# Patient Record
Sex: MALE | Race: WHITE | NOT HISPANIC OR LATINO | Employment: OTHER | URBAN - METROPOLITAN AREA
[De-identification: names, ages, dates, MRNs, and addresses within clinical notes are randomized per-mention and may not be internally consistent; named-entity substitution may affect disease eponyms.]

---

## 2017-12-04 NOTE — H&P
Subjective:     Status post fall 12/1/17 sustaining right patella fracture.  Significant history vascular disease insulin-dependent diabetic admitted for ORIF right patella    There are no active problems to display for this patient.    No past medical history on file.   No past surgical history on file.   No prescriptions prior to admission.     Review of patient's allergies indicates:  No Known Allergies   Social History   Substance Use Topics    Smoking status: Not on file    Smokeless tobacco: Not on file    Alcohol use Not on file      No family history on file.   Review of Systems  Pertinent items are noted in HPI.    Objective:     No data found.    Heart regular lungs clear severe swelling echymosis right knee consistent with fracture.  Defect at the patella.  Unable to walk.  Skin cool and poor  vascularization    Imaging Review  Displaced transverse patella fracture    Assessment:     There are no hospital problems to display for this patient.      Plan:     The various methods of treatment have been discussed with the patient and family.   After consideration of risks, benefits and other options for treatment, the patient has consented to surgical interventions (Significant risks with this type of fracture  As well as severe medical risks discussed).  Questions were answered and Pre-op teaching was done by me.

## 2017-12-05 ENCOUNTER — ANESTHESIA EVENT (OUTPATIENT)
Dept: SURGERY | Facility: OTHER | Age: 62
End: 2017-12-05
Payer: MEDICARE

## 2017-12-05 ENCOUNTER — HOSPITAL ENCOUNTER (OUTPATIENT)
Dept: PREADMISSION TESTING | Facility: OTHER | Age: 62
Discharge: HOME | End: 2017-12-05
Attending: ORTHOPAEDIC SURGERY
Payer: MEDICARE

## 2017-12-05 VITALS
DIASTOLIC BLOOD PRESSURE: 64 MMHG | SYSTOLIC BLOOD PRESSURE: 142 MMHG | TEMPERATURE: 101 F | OXYGEN SATURATION: 98 % | WEIGHT: 215 LBS | HEIGHT: 71 IN | HEART RATE: 98 BPM | BODY MASS INDEX: 30.1 KG/M2

## 2017-12-05 DIAGNOSIS — Z01.818 PRE-OPERATIVE CLEARANCE: ICD-10-CM

## 2017-12-05 LAB
ALBUMIN SERPL BCP-MCNC: 2.7 G/DL
ALP SERPL-CCNC: 46 U/L
ALT SERPL W/O P-5'-P-CCNC: 19 U/L
ANION GAP SERPL CALC-SCNC: 5 MMOL/L
AST SERPL-CCNC: 24 U/L
BASOPHILS # BLD AUTO: 0.01 K/UL
BASOPHILS NFR BLD: 0.1 %
BILIRUB SERPL-MCNC: 0.5 MG/DL
BILIRUB UR QL STRIP: NEGATIVE
BUN SERPL-MCNC: 33 MG/DL
CALCIUM SERPL-MCNC: 9 MG/DL
CHLORIDE SERPL-SCNC: 98 MMOL/L
CLARITY UR: CLEAR
CO2 SERPL-SCNC: 27 MMOL/L
COLOR UR: YELLOW
CREAT SERPL-MCNC: 2 MG/DL
DIFFERENTIAL METHOD: ABNORMAL
EOSINOPHIL # BLD AUTO: 0.1 K/UL
EOSINOPHIL NFR BLD: 0.7 %
ERYTHROCYTE [DISTWIDTH] IN BLOOD BY AUTOMATED COUNT: 14.5 %
EST. GFR  (AFRICAN AMERICAN): 40 ML/MIN/1.73 M^2
EST. GFR  (NON AFRICAN AMERICAN): 34 ML/MIN/1.73 M^2
GLUCOSE SERPL-MCNC: 314 MG/DL
GLUCOSE UR QL STRIP: ABNORMAL
HCT VFR BLD AUTO: 26.9 %
HGB BLD-MCNC: 8.8 G/DL
HGB UR QL STRIP: ABNORMAL
KETONES UR QL STRIP: NEGATIVE
LEUKOCYTE ESTERASE UR QL STRIP: NEGATIVE
LYMPHOCYTES # BLD AUTO: 1.1 K/UL
LYMPHOCYTES NFR BLD: 7.2 %
MCH RBC QN AUTO: 27.8 PG
MCHC RBC AUTO-ENTMCNC: 32.7 G/DL
MCV RBC AUTO: 85 FL
MONOCYTES # BLD AUTO: 1.8 K/UL
MONOCYTES NFR BLD: 12 %
NEUTROPHILS # BLD AUTO: 12.1 K/UL
NEUTROPHILS NFR BLD: 79.6 %
NITRITE UR QL STRIP: NEGATIVE
PH UR STRIP: 6 [PH] (ref 5–8)
PLATELET # BLD AUTO: 259 K/UL
PMV BLD AUTO: 9.7 FL
POTASSIUM SERPL-SCNC: 4.6 MMOL/L
PROT SERPL-MCNC: 6.9 G/DL
PROT UR QL STRIP: ABNORMAL
RBC # BLD AUTO: 3.17 M/UL
SODIUM SERPL-SCNC: 130 MMOL/L
SP GR UR STRIP: 1.01 (ref 1–1.03)
URN SPEC COLLECT METH UR: ABNORMAL
UROBILINOGEN UR STRIP-ACNC: NEGATIVE EU/DL
WBC # BLD AUTO: 15.24 K/UL

## 2017-12-05 PROCEDURE — 93010 ELECTROCARDIOGRAM REPORT: CPT | Mod: ,,, | Performed by: INTERNAL MEDICINE

## 2017-12-05 PROCEDURE — 85025 COMPLETE CBC W/AUTO DIFF WBC: CPT

## 2017-12-05 PROCEDURE — 80053 COMPREHEN METABOLIC PANEL: CPT

## 2017-12-05 PROCEDURE — 81003 URINALYSIS AUTO W/O SCOPE: CPT

## 2017-12-05 PROCEDURE — 36415 COLL VENOUS BLD VENIPUNCTURE: CPT

## 2017-12-05 PROCEDURE — 93005 ELECTROCARDIOGRAM TRACING: CPT

## 2017-12-05 RX ORDER — ATORVASTATIN CALCIUM 20 MG/1
20 TABLET, FILM COATED ORAL DAILY
COMMUNITY

## 2017-12-05 RX ORDER — FENOFIBRATE 160 MG/1
160 TABLET ORAL DAILY
COMMUNITY

## 2017-12-05 RX ORDER — OXYCODONE AND ACETAMINOPHEN 5; 325 MG/1; MG/1
1 TABLET ORAL EVERY 4 HOURS PRN
Status: ON HOLD | COMMUNITY
End: 2017-12-09

## 2017-12-05 RX ORDER — IBUPROFEN 100 MG/5ML
1000 SUSPENSION, ORAL (FINAL DOSE FORM) ORAL DAILY
COMMUNITY

## 2017-12-05 RX ORDER — CALCIUM CARBONATE/VITAMIN D3 500-10/5ML
LIQUID (ML) ORAL 2 TIMES DAILY
COMMUNITY

## 2017-12-05 RX ORDER — INSULIN ASPART 100 [IU]/ML
INJECTION, SOLUTION INTRAVENOUS; SUBCUTANEOUS
COMMUNITY

## 2017-12-05 RX ORDER — INSULIN GLARGINE 100 [IU]/ML
22 INJECTION, SOLUTION SUBCUTANEOUS NIGHTLY
COMMUNITY
End: 2020-11-05 | Stop reason: SDUPTHER

## 2017-12-05 RX ORDER — SODIUM CHLORIDE, SODIUM LACTATE, POTASSIUM CHLORIDE, CALCIUM CHLORIDE 600; 310; 30; 20 MG/100ML; MG/100ML; MG/100ML; MG/100ML
INJECTION, SOLUTION INTRAVENOUS CONTINUOUS
Status: CANCELLED | OUTPATIENT
Start: 2017-12-05

## 2017-12-05 RX ORDER — POTASSIUM CHLORIDE 750 MG/1
10 CAPSULE, EXTENDED RELEASE ORAL ONCE
COMMUNITY

## 2017-12-05 RX ORDER — CLOPIDOGREL BISULFATE 75 MG/1
75 TABLET ORAL DAILY
COMMUNITY

## 2017-12-05 RX ORDER — ROPINIROLE 1 MG/1
1 TABLET, FILM COATED ORAL NIGHTLY
COMMUNITY

## 2017-12-05 RX ORDER — SAXAGLIPTIN 5 MG/1
TABLET, FILM COATED ORAL DAILY
COMMUNITY
End: 2020-11-06

## 2017-12-05 RX ORDER — GABAPENTIN 300 MG/1
300 CAPSULE ORAL 2 TIMES DAILY
COMMUNITY

## 2017-12-05 RX ORDER — SULFAMETHOXAZOLE AND TRIMETHOPRIM 800; 160 MG/1; MG/1
1 TABLET ORAL
COMMUNITY
End: 2018-04-09 | Stop reason: CLARIF

## 2017-12-05 RX ORDER — ASPIRIN 81 MG/1
81 TABLET ORAL DAILY
COMMUNITY

## 2017-12-05 RX ORDER — LIDOCAINE HYDROCHLORIDE 10 MG/ML
1 INJECTION, SOLUTION EPIDURAL; INFILTRATION; INTRACAUDAL; PERINEURAL ONCE
Status: CANCELLED | OUTPATIENT
Start: 2017-12-05 | End: 2017-12-05

## 2017-12-05 RX ORDER — METOPROLOL SUCCINATE 50 MG/1
25 TABLET, EXTENDED RELEASE ORAL DAILY
COMMUNITY

## 2017-12-05 NOTE — ANESTHESIA PREPROCEDURE EVALUATION
12/05/2017  Luca Camarena III is a 62 y.o., male.    Anesthesia Evaluation    I have reviewed the Patient Summary Reports.    I have reviewed the Nursing Notes.   I have reviewed the Medications.     Review of Systems  Anesthesia Hx:  No problems with previous Anesthesia  History of prior surgery of interest to airway management or planning: Previous anesthesia: General Partial foot amp 2 yrs ago with general anesthesia.  Denies Family Hx of Anesthesia complications.   Denies Personal Hx of Anesthesia complications.   Social:  Non-Smoker    Hematology/Oncology:  Hematology Normal   Oncology Normal     EENT/Dental:EENT/Dental Normal   Cardiovascular:   Exercise tolerance: poor Hypertension, well controlled CAD  CABG/stent  PVD hyperlipidemia Stents 2004 followed by Cardiology on PLavix   Pulmonary:   Followed by pulmonology for paralyzed diaphram   Renal/:   Chronic Renal Disease, CRI Followed by Nephrology for CRI   Hepatic/GI:  Hepatic/GI Normal    Musculoskeletal:   Arthritis     Neurological:   Neuromuscular Disease,    Endocrine:   Diabetes, type 1, using insulin    Psych:  Psychiatric Normal           Physical Exam  General:  Obesity    Airway/Jaw/Neck:  Airway Findings: Mouth Opening: Normal Tongue: Normal  Mallampati: II      Dental:  Dental Findings: Periodontal disease, Mild, molar caps        Mental Status:  Mental Status Findings:  Cooperative, Alert and Oriented         Anesthesia Plan  Type of Anesthesia, risks & benefits discussed:  Anesthesia Type:  general  Patient's Preference:   Intra-op Monitoring Plan:   Intra-op Monitoring Plan Comments:   Post Op Pain Control Plan:   Post Op Pain Control Plan Comments:   Induction:   IV  Beta Blocker:         Informed Consent: Patient understands risks and agrees with Anesthesia plan.  Questions answered. Anesthesia consent signed with patient.  ASA  Score: 3     Day of Surgery Review of History & Physical:    H&P update referred to the surgeon.     Anesthesia Plan Notes: Still on Plavix, no spinal.labs and EKG today,[facility] [place] issues    H/h 8.8/26.9.  Dorothy aware. T+S ordered        Ready For Surgery From Anesthesia Perspective.

## 2017-12-05 NOTE — DISCHARGE INSTRUCTIONS
PRE-ADMIT TESTING -  [phone]    [address]  [place] [facility]          Your surgery has been scheduled at [facility]. We are pleased to have the opportunity to serve you. For Further Information please call [phone].    On the day of surgery please report to the Information Desk on the 1st floor.    · CONTACT YOUR PHYSICIAN'S OFFICE THE DAY PRIOR TO YOUR SURGERY TO OBTAIN YOUR ARRIVAL TIME.     · The evening before surgery do not eat anything after 9 p.m. ( this includes hard candy, chewing gum and mints).  You may only have GATORADE, POWERADE AND WATER  from 9 p.m. until you leave your home.   DO NOT DRINK ANY LIQUIDS ON THE WAY TO THE HOSPITAL.      SPECIAL MEDICATION INSTRUCTIONS: TAKE medications checked off by the Anesthesiologist on your Medication List.    Angiogram Patients: Take medications as instructed by your physician, including aspirin.     Surgery Patients:    If you take ASPIRIN - Your PHYSICIAN/SURGEON will need to inform you IF/OR when you need to stop taking aspirin prior to your surgery.     Do Not take any medications containing IBUPROFEN.  Do Not Wear any make-up or dark nail polish   (especially eye make-up) to surgery. If you come to surgery with makeup on you will be required to remove the makeup or nail polish.    Do not shave your surgical area at least 5 days prior to your surgery. The surgical prep will be performed at the hospital according to Infection Control regulations.    Leave all valuables at home.   Do Not wear any jewelry or watches, including any metal in body piercings.  Contact Lens must be removed before surgery. Either do not wear the contact lens or bring a case and solution for storage.  Please bring a container for eyeglasses or dentures as required.  Bring any paperwork your physician has provided, such as consent forms,  history and physicals, doctor's orders, etc.   Bring comfortable clothes that are loose fitting to wear upon  discharge. Take into consideration the type of surgery being performed.  Maintain your diet as advised per your physician the day prior to surgery.      Adequate rest the night before surgery is advised.   Park in the Parking lot behind the hospital or in the [place] [facility] across the street from the parking lot. Parking is complimentary.  If you will be discharged the same day as your procedure, please arrange for a responsible adult to drive you home or to accompany you if traveling by taxi.   YOU WILL NOT BE PERMITTED TO DRIVE OR TO LEAVE THE HOSPITAL ALONE AFTER SURGERY.   It is strongly recommended that you arrange for someone to remain with you for the first 24 hrs following your surgery.       Thank you for your cooperation.  The Staff of [facility].        Bathing Instructions                                                                 Please shower the evening before and morning of your procedure with    ANTIBACTERIAL SOAP. ( DIAL, etc )  Concentrate on the surgical area   for at least 3 minutes and rinse completely. Dry off as usual.   Do not use any deodorant, powder, body lotions, perfume, after shave or    cologne.

## 2017-12-08 ENCOUNTER — ANESTHESIA (OUTPATIENT)
Dept: SURGERY | Facility: OTHER | Age: 62
End: 2017-12-08
Payer: MEDICARE

## 2017-12-08 ENCOUNTER — HOSPITAL ENCOUNTER (OUTPATIENT)
Facility: OTHER | Age: 62
LOS: 1 days | Discharge: HOME HEALTH | End: 2017-12-10
Attending: ORTHOPAEDIC SURGERY | Admitting: ORTHOPAEDIC SURGERY
Payer: MEDICARE

## 2017-12-08 DIAGNOSIS — S82.001A RIGHT PATELLA FRACTURE: ICD-10-CM

## 2017-12-08 LAB
ABO + RH BLD: NORMAL
BLD GP AB SCN CELLS X3 SERPL QL: NORMAL
POCT GLUCOSE: 112 MG/DL (ref 70–110)
POCT GLUCOSE: 115 MG/DL (ref 70–110)
POCT GLUCOSE: 236 MG/DL (ref 70–110)

## 2017-12-08 PROCEDURE — 63600175 PHARM REV CODE 636 W HCPCS: Performed by: NURSE ANESTHETIST, CERTIFIED REGISTERED

## 2017-12-08 PROCEDURE — 37000009 HC ANESTHESIA EA ADD 15 MINS: Performed by: ORTHOPAEDIC SURGERY

## 2017-12-08 PROCEDURE — 86900 BLOOD TYPING SEROLOGIC ABO: CPT

## 2017-12-08 PROCEDURE — 25000003 PHARM REV CODE 250: Performed by: NURSE PRACTITIONER

## 2017-12-08 PROCEDURE — 36000710: Performed by: ORTHOPAEDIC SURGERY

## 2017-12-08 PROCEDURE — 63600175 PHARM REV CODE 636 W HCPCS: Performed by: ANESTHESIOLOGY

## 2017-12-08 PROCEDURE — 63600175 PHARM REV CODE 636 W HCPCS: Performed by: ORTHOPAEDIC SURGERY

## 2017-12-08 PROCEDURE — 86901 BLOOD TYPING SEROLOGIC RH(D): CPT

## 2017-12-08 PROCEDURE — 94761 N-INVAS EAR/PLS OXIMETRY MLT: CPT

## 2017-12-08 PROCEDURE — 71000033 HC RECOVERY, INTIAL HOUR: Performed by: ORTHOPAEDIC SURGERY

## 2017-12-08 PROCEDURE — 37000008 HC ANESTHESIA 1ST 15 MINUTES: Performed by: ORTHOPAEDIC SURGERY

## 2017-12-08 PROCEDURE — 71000039 HC RECOVERY, EACH ADD'L HOUR: Performed by: ORTHOPAEDIC SURGERY

## 2017-12-08 PROCEDURE — 25000003 PHARM REV CODE 250: Performed by: ANESTHESIOLOGY

## 2017-12-08 PROCEDURE — 63600175 PHARM REV CODE 636 W HCPCS: Performed by: NURSE PRACTITIONER

## 2017-12-08 PROCEDURE — 82962 GLUCOSE BLOOD TEST: CPT | Performed by: ORTHOPAEDIC SURGERY

## 2017-12-08 PROCEDURE — 36000711: Performed by: ORTHOPAEDIC SURGERY

## 2017-12-08 PROCEDURE — 25000003 PHARM REV CODE 250: Performed by: NURSE ANESTHETIST, CERTIFIED REGISTERED

## 2017-12-08 RX ORDER — ACETAMINOPHEN 10 MG/ML
INJECTION, SOLUTION INTRAVENOUS
Status: DISCONTINUED | OUTPATIENT
Start: 2017-12-08 | End: 2017-12-08

## 2017-12-08 RX ORDER — OXYCODONE HYDROCHLORIDE 5 MG/1
5 TABLET ORAL
Status: DISCONTINUED | OUTPATIENT
Start: 2017-12-08 | End: 2017-12-08 | Stop reason: SDUPTHER

## 2017-12-08 RX ORDER — PROPOFOL 10 MG/ML
VIAL (ML) INTRAVENOUS
Status: DISCONTINUED | OUTPATIENT
Start: 2017-12-08 | End: 2017-12-08

## 2017-12-08 RX ORDER — GLUCAGON 1 MG
1 KIT INJECTION
Status: DISCONTINUED | OUTPATIENT
Start: 2017-12-08 | End: 2017-12-10 | Stop reason: HOSPADM

## 2017-12-08 RX ORDER — SODIUM CHLORIDE, SODIUM LACTATE, POTASSIUM CHLORIDE, CALCIUM CHLORIDE 600; 310; 30; 20 MG/100ML; MG/100ML; MG/100ML; MG/100ML
INJECTION, SOLUTION INTRAVENOUS CONTINUOUS
Status: DISCONTINUED | OUTPATIENT
Start: 2017-12-08 | End: 2017-12-08

## 2017-12-08 RX ORDER — GLYCOPYRROLATE 0.2 MG/ML
INJECTION INTRAMUSCULAR; INTRAVENOUS
Status: DISCONTINUED | OUTPATIENT
Start: 2017-12-08 | End: 2017-12-08

## 2017-12-08 RX ORDER — FENTANYL CITRATE 50 UG/ML
25 INJECTION, SOLUTION INTRAMUSCULAR; INTRAVENOUS EVERY 5 MIN PRN
Status: DISCONTINUED | OUTPATIENT
Start: 2017-12-08 | End: 2017-12-08 | Stop reason: HOSPADM

## 2017-12-08 RX ORDER — GABAPENTIN 100 MG/1
100 CAPSULE ORAL 2 TIMES DAILY
Status: DISCONTINUED | OUTPATIENT
Start: 2017-12-08 | End: 2017-12-10 | Stop reason: HOSPADM

## 2017-12-08 RX ORDER — SODIUM CHLORIDE 0.9 % (FLUSH) 0.9 %
3 SYRINGE (ML) INJECTION
Status: DISCONTINUED | OUTPATIENT
Start: 2017-12-08 | End: 2017-12-10 | Stop reason: HOSPADM

## 2017-12-08 RX ORDER — ROCURONIUM BROMIDE 10 MG/ML
INJECTION, SOLUTION INTRAVENOUS
Status: DISCONTINUED | OUTPATIENT
Start: 2017-12-08 | End: 2017-12-08

## 2017-12-08 RX ORDER — MEPERIDINE HYDROCHLORIDE 50 MG/ML
12.5 INJECTION INTRAMUSCULAR; INTRAVENOUS; SUBCUTANEOUS ONCE AS NEEDED
Status: DISCONTINUED | OUTPATIENT
Start: 2017-12-08 | End: 2017-12-08 | Stop reason: SDUPTHER

## 2017-12-08 RX ORDER — SODIUM CHLORIDE 0.9 % (FLUSH) 0.9 %
5 SYRINGE (ML) INJECTION
Status: DISCONTINUED | OUTPATIENT
Start: 2017-12-08 | End: 2017-12-10 | Stop reason: HOSPADM

## 2017-12-08 RX ORDER — MEPERIDINE HYDROCHLORIDE 50 MG/ML
12.5 INJECTION INTRAMUSCULAR; INTRAVENOUS; SUBCUTANEOUS ONCE AS NEEDED
Status: DISCONTINUED | OUTPATIENT
Start: 2017-12-08 | End: 2017-12-08 | Stop reason: HOSPADM

## 2017-12-08 RX ORDER — ONDANSETRON HYDROCHLORIDE 2 MG/ML
INJECTION, SOLUTION INTRAMUSCULAR; INTRAVENOUS
Status: DISCONTINUED | OUTPATIENT
Start: 2017-12-08 | End: 2017-12-08

## 2017-12-08 RX ORDER — OXYCODONE HYDROCHLORIDE 5 MG/1
5 TABLET ORAL
Status: DISCONTINUED | OUTPATIENT
Start: 2017-12-08 | End: 2017-12-08 | Stop reason: HOSPADM

## 2017-12-08 RX ORDER — CEFAZOLIN SODIUM 2 G/50ML
2 SOLUTION INTRAVENOUS
Status: COMPLETED | OUTPATIENT
Start: 2017-12-08 | End: 2017-12-08

## 2017-12-08 RX ORDER — LIDOCAINE HCL/PF 100 MG/5ML
SYRINGE (ML) INTRAVENOUS
Status: DISCONTINUED | OUTPATIENT
Start: 2017-12-08 | End: 2017-12-08

## 2017-12-08 RX ORDER — OXYCODONE HYDROCHLORIDE 5 MG/1
10 TABLET ORAL EVERY 4 HOURS PRN
Status: DISCONTINUED | OUTPATIENT
Start: 2017-12-08 | End: 2017-12-10 | Stop reason: HOSPADM

## 2017-12-08 RX ORDER — INSULIN ASPART 100 [IU]/ML
0-5 INJECTION, SOLUTION INTRAVENOUS; SUBCUTANEOUS
Status: DISCONTINUED | OUTPATIENT
Start: 2017-12-08 | End: 2017-12-10 | Stop reason: HOSPADM

## 2017-12-08 RX ORDER — OXYCODONE HYDROCHLORIDE 5 MG/1
5 TABLET ORAL EVERY 4 HOURS PRN
Status: DISCONTINUED | OUTPATIENT
Start: 2017-12-08 | End: 2017-12-10 | Stop reason: HOSPADM

## 2017-12-08 RX ORDER — ROPIVACAINE HYDROCHLORIDE 5 MG/ML
INJECTION, SOLUTION EPIDURAL; INFILTRATION; PERINEURAL
Status: DISCONTINUED | OUTPATIENT
Start: 2017-12-08 | End: 2017-12-08 | Stop reason: HOSPADM

## 2017-12-08 RX ORDER — ONDANSETRON 2 MG/ML
4 INJECTION INTRAMUSCULAR; INTRAVENOUS DAILY PRN
Status: DISCONTINUED | OUTPATIENT
Start: 2017-12-08 | End: 2017-12-08 | Stop reason: HOSPADM

## 2017-12-08 RX ORDER — ROPINIROLE 0.25 MG/1
1 TABLET, FILM COATED ORAL NIGHTLY
Status: DISCONTINUED | OUTPATIENT
Start: 2017-12-08 | End: 2017-12-10 | Stop reason: HOSPADM

## 2017-12-08 RX ORDER — LIDOCAINE HYDROCHLORIDE 10 MG/ML
1 INJECTION, SOLUTION EPIDURAL; INFILTRATION; INTRACAUDAL; PERINEURAL ONCE
Status: DISCONTINUED | OUTPATIENT
Start: 2017-12-08 | End: 2017-12-08 | Stop reason: HOSPADM

## 2017-12-08 RX ORDER — HYDROMORPHONE HYDROCHLORIDE 2 MG/ML
0.4 INJECTION, SOLUTION INTRAMUSCULAR; INTRAVENOUS; SUBCUTANEOUS EVERY 5 MIN PRN
Status: DISCONTINUED | OUTPATIENT
Start: 2017-12-08 | End: 2017-12-08 | Stop reason: SDUPTHER

## 2017-12-08 RX ORDER — ONDANSETRON 2 MG/ML
4 INJECTION INTRAMUSCULAR; INTRAVENOUS DAILY PRN
Status: DISCONTINUED | OUTPATIENT
Start: 2017-12-08 | End: 2017-12-08 | Stop reason: SDUPTHER

## 2017-12-08 RX ORDER — IBUPROFEN 200 MG
24 TABLET ORAL
Status: DISCONTINUED | OUTPATIENT
Start: 2017-12-08 | End: 2017-12-10 | Stop reason: HOSPADM

## 2017-12-08 RX ORDER — ATORVASTATIN CALCIUM 20 MG/1
20 TABLET, FILM COATED ORAL DAILY
Status: DISCONTINUED | OUTPATIENT
Start: 2017-12-09 | End: 2017-12-10 | Stop reason: HOSPADM

## 2017-12-08 RX ORDER — FENOFIBRATE 160 MG/1
160 TABLET ORAL DAILY
Status: DISCONTINUED | OUTPATIENT
Start: 2017-12-09 | End: 2017-12-10 | Stop reason: HOSPADM

## 2017-12-08 RX ORDER — FENTANYL CITRATE 50 UG/ML
INJECTION, SOLUTION INTRAMUSCULAR; INTRAVENOUS
Status: DISCONTINUED | OUTPATIENT
Start: 2017-12-08 | End: 2017-12-08

## 2017-12-08 RX ORDER — IBUPROFEN 200 MG
16 TABLET ORAL
Status: DISCONTINUED | OUTPATIENT
Start: 2017-12-08 | End: 2017-12-10 | Stop reason: HOSPADM

## 2017-12-08 RX ORDER — SODIUM CHLORIDE 9 MG/ML
INJECTION, SOLUTION INTRAVENOUS CONTINUOUS
Status: DISCONTINUED | OUTPATIENT
Start: 2017-12-08 | End: 2017-12-09

## 2017-12-08 RX ORDER — NEOSTIGMINE METHYLSULFATE 1 MG/ML
INJECTION, SOLUTION INTRAVENOUS
Status: DISCONTINUED | OUTPATIENT
Start: 2017-12-08 | End: 2017-12-08

## 2017-12-08 RX ORDER — HYDROMORPHONE HYDROCHLORIDE 2 MG/ML
0.4 INJECTION, SOLUTION INTRAMUSCULAR; INTRAVENOUS; SUBCUTANEOUS EVERY 5 MIN PRN
Status: DISCONTINUED | OUTPATIENT
Start: 2017-12-08 | End: 2017-12-08 | Stop reason: HOSPADM

## 2017-12-08 RX ORDER — METOPROLOL SUCCINATE 50 MG/1
50 TABLET, EXTENDED RELEASE ORAL DAILY
Status: DISCONTINUED | OUTPATIENT
Start: 2017-12-09 | End: 2017-12-10 | Stop reason: HOSPADM

## 2017-12-08 RX ADMIN — HYDROMORPHONE HYDROCHLORIDE 0.4 MG: 2 INJECTION INTRAMUSCULAR; INTRAVENOUS; SUBCUTANEOUS at 02:12

## 2017-12-08 RX ADMIN — FENTANYL CITRATE 100 MCG: 50 INJECTION, SOLUTION INTRAMUSCULAR; INTRAVENOUS at 01:12

## 2017-12-08 RX ADMIN — HYDROMORPHONE HYDROCHLORIDE 0.4 MG: 2 INJECTION INTRAMUSCULAR; INTRAVENOUS; SUBCUTANEOUS at 01:12

## 2017-12-08 RX ADMIN — FENTANYL CITRATE 100 MCG: 50 INJECTION, SOLUTION INTRAMUSCULAR; INTRAVENOUS at 12:12

## 2017-12-08 RX ADMIN — ROCURONIUM BROMIDE 30 MG: 10 INJECTION, SOLUTION INTRAVENOUS at 12:12

## 2017-12-08 RX ADMIN — SODIUM CHLORIDE, SODIUM LACTATE, POTASSIUM CHLORIDE, AND CALCIUM CHLORIDE: 600; 310; 30; 20 INJECTION, SOLUTION INTRAVENOUS at 12:12

## 2017-12-08 RX ADMIN — ROPINIROLE HYDROCHLORIDE 1 MG: 0.25 TABLET, FILM COATED ORAL at 08:12

## 2017-12-08 RX ADMIN — ONDANSETRON 4 MG: 2 INJECTION, SOLUTION INTRAMUSCULAR; INTRAVENOUS at 01:12

## 2017-12-08 RX ADMIN — OXYCODONE HYDROCHLORIDE 5 MG: 5 TABLET ORAL at 03:12

## 2017-12-08 RX ADMIN — ACETAMINOPHEN 1000 MG: 10 INJECTION, SOLUTION INTRAVENOUS at 01:12

## 2017-12-08 RX ADMIN — CARBOXYMETHYLCELLULOSE SODIUM 2 DROP: 2.5 SOLUTION/ DROPS OPHTHALMIC at 12:12

## 2017-12-08 RX ADMIN — LIDOCAINE HYDROCHLORIDE 100 MG: 20 INJECTION, SOLUTION INTRAVENOUS at 12:12

## 2017-12-08 RX ADMIN — PROPOFOL 150 MG: 10 INJECTION, EMULSION INTRAVENOUS at 12:12

## 2017-12-08 RX ADMIN — SODIUM CHLORIDE: 0.9 INJECTION, SOLUTION INTRAVENOUS at 04:12

## 2017-12-08 RX ADMIN — CEFAZOLIN SODIUM 2 G: 2 SOLUTION INTRAVENOUS at 01:12

## 2017-12-08 RX ADMIN — FENTANYL CITRATE 50 MCG: 50 INJECTION, SOLUTION INTRAMUSCULAR; INTRAVENOUS at 01:12

## 2017-12-08 RX ADMIN — GABAPENTIN 100 MG: 100 CAPSULE ORAL at 08:12

## 2017-12-08 RX ADMIN — INSULIN DETEMIR 20 UNITS: 100 INJECTION, SOLUTION SUBCUTANEOUS at 08:12

## 2017-12-08 RX ADMIN — NEOSTIGMINE METHYLSULFATE 5 MG: 1 INJECTION INTRAVENOUS at 01:12

## 2017-12-08 RX ADMIN — GLYCOPYRROLATE 0.6 MG: 0.2 INJECTION, SOLUTION INTRAMUSCULAR; INTRAVENOUS at 01:12

## 2017-12-08 RX ADMIN — INSULIN ASPART 1 UNITS: 100 INJECTION, SOLUTION INTRAVENOUS; SUBCUTANEOUS at 08:12

## 2017-12-08 NOTE — BRIEF OP NOTE
[facility]  Brief Operative Note     SUMMARY     Surgery Date: 12/8/2017     Surgeon(s) and Role:     * Armando Augustin MD - Primary    Assisting Surgeon: None    Pre-op Diagnosis:  Closed fracture of proximal end of right tibia, unspecified fracture morphology, initial encounter [S82.101A]    Post-op Diagnosis:  Post-Op Diagnosis Codes:     * Closed fracture of proximal end of right tibia, unspecified fracture morphology, initial encounter [S82.101A]    Procedure(s) (LRB):  OPEN REDUCTION INTERNAL FIXATION-PATELLA (Right)    Anesthesia: General    Description of the findings of the procedure: Transverse right patella fracture ORIF with suture material    Findings/Key Components: Measures right patella fracture    Estimated Blood Loss: * No values recorded between 12/8/2017  1:09 PM and 12/8/2017  1:39 PM *         Specimens:   Specimen (12h ago through future)    None          Discharge Note    SUMMARY     Admit Date: 12/8/2017    Discharge Date and Time: No discharge date for patient encounter.    Hospital Course (synopsis of major diagnoses, care, treatment, and services provided during the course of the hospital stay): The above patient underwent the above outpatient procedure. The patient tolerated procedure well and will be discharged today.--see orders.       Final Diagnosis: Post-Op Diagnosis Codes:     * Closed fracture of proximal end of right tibia, unspecified fracture morphology, initial encounter [S82.101A]    Disposition: Home or Self Care    Follow Up/Patient Instructions:     Medications:  Reconciled Home Medications:   Current Discharge Medication List      CONTINUE these medications which have NOT CHANGED    Details   ACETAMINOPHEN (TYLENOL ARTHRITIS ORAL) Take by mouth.      ascorbic acid, vitamin C, (VITAMIN C) 1000 MG tablet Take 1,000 mg by mouth once daily.      aspirin (ECOTRIN) 81 MG EC tablet Take 81 mg by mouth once daily.      atorvastatin (LIPITOR) 20 MG tablet  Take 20 mg by mouth once daily.      clopidogrel (PLAVIX) 75 mg tablet Take 75 mg by mouth once daily.      CYANOCOBALAMIN, VITAMIN B-12, (VITAMIN B-12 ORAL) Take by mouth.      fenofibrate 160 MG Tab Take 160 mg by mouth once daily.      FERROUS SULFATE (IRON ORAL) Take by mouth.      gabapentin (NEURONTIN) 300 MG capsule Take 300 mg by mouth 2 (two) times daily. 1 in the morning, 3 in the evening      GUAIFENESIN/DEXTROMETHORPHAN (MUCINEX DM ORAL) Take by mouth 2 (two) times daily.      insulin aspart (NOVOLOG) 100 unit/mL injection Inject into the skin 3 (three) times daily before meals. 16/14/18 units      insulin glargine (LANTUS) 100 unit/mL injection Inject 28 Units into the skin every evening.      magnesium oxide 400 mg Cap Take by mouth 2 (two) times daily.      metoprolol succinate (TOPROL-XL) 50 MG 24 hr tablet Take 50 mg by mouth 2 (two) times daily. 1/2 tablet in the morning, 1 in the evening      multivitamin capsule Take 1 capsule by mouth once daily.      oxyCODONE-acetaminophen (PERCOCET) 5-325 mg per tablet Take 1 tablet by mouth every 4 (four) hours as needed for Pain.      potassium chloride (MICRO-K) 10 MEQ CpSR Take 10 mEq by mouth once.      rOPINIRole (REQUIP) 1 MG tablet Take 1 mg by mouth every evening.      SAXagliptin (ONGLYZA) 5 mg Tab tablet Take by mouth once daily.      sulfamethoxazole-trimethoprim 800-160mg (BACTRIM DS) 800-160 mg Tab Take 1 tablet by mouth every 12 (twelve) hours.           No discharge procedures on file.

## 2017-12-08 NOTE — PROGRESS NOTES
The patient verbalized understanding of the blood consent as explained and documented on the consent form  by the physician and  has no further questions at this time. The blood consent was signed by the patient and the patient's signature was witnessed by the RN.

## 2017-12-08 NOTE — INTERVAL H&P NOTE
The patient has been examined and the H&P has been reviewed:    I concur with the findings and no changes have occurred since H&P was written.    Anesthesia/Surgery risks, benefits and alternative options discussed and understood by patient/family.          Active Hospital Problems    Diagnosis  POA    *Right patella fracture [S82.001A]  Yes      Resolved Hospital Problems    Diagnosis Date Resolved POA   No resolved problems to display.

## 2017-12-08 NOTE — ANESTHESIA POSTPROCEDURE EVALUATION
"Anesthesia Post Evaluation    Patient: Luca Allisonler III    Procedure(s) Performed: Procedure(s) (LRB):  OPEN REDUCTION INTERNAL FIXATION-PATELLA (Right)    Final Anesthesia Type: general  Patient location during evaluation: PACU  Patient participation: Yes- Able to Participate  Level of consciousness: awake and alert  Post-procedure vital signs: reviewed and stable  Pain management: adequate  Airway patency: patent  PONV status at discharge: No PONV  Anesthetic complications: no      Cardiovascular status: blood pressure returned to baseline and stable  Respiratory status: unassisted, spontaneous ventilation and room air  Hydration status: euvolemic  Follow-up not needed.        Visit Vitals  /61 (BP Location: Right arm, Patient Position: Lying)   Pulse 96   Temp 37.3 °C (99.1 °F) (Oral)   Resp 16   Ht 5' 11" (1.803 m)   Wt 97.5 kg (215 lb)   SpO2 96%   BMI 29.99 kg/m²       Pain/Oscar Score: Pain Assessment Performed: Yes (12/8/2017  9:45 AM)  Presence of Pain: complains of pain/discomfort (12/8/2017  9:45 AM)      "

## 2017-12-08 NOTE — TRANSFER OF CARE
"Anesthesia Transfer of Care Note    Patient: Luca Allisonler III    Procedure(s) Performed: Procedure(s) (LRB):  OPEN REDUCTION INTERNAL FIXATION-PATELLA (Right)    Patient location: PACU    Anesthesia Type: general    Post pain: adequate analgesia    Post assessment: no apparent anesthetic complications    Post vital signs: stable    Level of consciousness: awake and alert    Nausea/Vomiting: no nausea/vomiting    Complications: none    Transfer of care protocol was followed      Last vitals:   Visit Vitals  BP (!) 164/71 (BP Location: Left arm, Patient Position: Lying)   Pulse 94   Temp 37 °C (98.6 °F) (Oral)   Resp 20   Ht 5' 11" (1.803 m)   Wt 97.5 kg (215 lb)   SpO2 99%   BMI 29.99 kg/m²     "

## 2017-12-08 NOTE — OP NOTE
[facility]  Surgery Department  Operative Note    SUMMARY     Date of Procedure: 12/8/2017     Procedure: Procedure(s) (LRB):  OPEN REDUCTION INTERNAL FIXATION-PATELLA (Right)     Surgeon(s) and Role:     * Armando Augustin MD - Primary    Assisting Surgeon: None    Pre-Operative Diagnosis: Closed fracture of proximal end of right tibia, unspecified fracture morphology, initial encounter [S82.101A]    Post-Operative Diagnosis: Post-Op Diagnosis Codes:     * Closed fracture of proximal end of right tibia, unspecified fracture morphology, initial encounter [S82.101A]    Anesthesia: General    Technical Procedures Used: Patient was brought to the operating room underwent general anesthesia without difficulty.  Right lower extremity was prepped and draped in usual fashion tourniquet was applied due to his poor skin.  The plan was to try to be as least traumatic to this gentleman because of his poor medical status and poor skin.  I did not want to put hardware in the knee because of difficulties with skin.  Midline incision was made.  Sharp dissection made down to the extensor mechanism transverse patella fracture was identified and cleaned up irrigated reduced and then #5 hi-fi suture material was used to hold the proximal and distal poles together.  2orthotic cord as well as #1 Vicryl was used to reinforce the repair.  C-arm showed near anatomic alignment.  2-0 Vicryl was used subcutaneously and Steri-Strips on the skin half percent ropivacaine was instilled the soft tissues.  He is pleasant bulky sterile dressing with a hinge knee brace in full extension brought to recovery room stable fashion and dictation on Harpreet Camarena    Description of the Findings of the Procedure: As varus patella fracture with significant edema and ecchymosis due to medical condition and medications    Significant Surgical Tasks Conducted by the Assistant(s), if Applicable: None    Complications: No    Estimated Blood Loss  (EBL): * No values recorded between 12/8/2017  1:09 PM and 12/8/2017  1:38 PM *0           Implants: * No implants in log *    Specimens:   Specimen (12h ago through future)    None                  Condition: Good    Disposition: PACU - hemodynamically stable.    Attestation: I was present and scrubbed for the entire procedure.

## 2017-12-08 NOTE — CONSULTS
"Consult Note  Internal Medicine    Consult Requested By: Armando Augustin MD  Reason for Consult: patella fracture,T2DM, protein/calorie malnutrition, leukocytosis, anemia, hyponatremia, RLS, HTN, diaphragm paralysis, CKD3, CAD    SUBJECTIVE:     History of Present Illness:   62 y.o. male presents with scheduled patella repair after fall at home. Patient seen in PACU with nursing staff at bedside.   Denies CP,SOB,N/V, doess report fever and chills. Patient has grandchildren who are "always sick". Patient also just finished a course of Bactrim, prescribed by [facility] ED. Epic and bedside chart reviewed.    Past Medical History:   Diagnosis Date    Anticoagulant long-term use     Coronary artery disease 2004    stents x 3    CRI (chronic renal insufficiency)     Diabetes mellitus     Diaphragm paralysis     Elbow wound 12/01/2017    s/p fall; 3 stitches    Fall 12/01/2017    Glaucoma     Hearing deficit     Hypertension     Legally blind     Restless leg      Past Surgical History:   Procedure Laterality Date    CORONARY STENT PLACEMENT  2004    EYE SURGERY Bilateral     cataract and vitrectomy    foot surgery Bilateral     toe amputations    GASTRIC BYPASS  2011    KNEE SURGERY Right     x 2    ROTATOR CUFF REPAIR Bilateral     TONSILLECTOMY       History reviewed. No pertinent family history.  Social History   Substance Use Topics    Smoking status: Never Smoker    Smokeless tobacco: Never Used    Alcohol use Yes      Comment: rare       Review of patient's allergies indicates:  No Known Allergies     Review of Systems:  Constitutional: + fever or chills  Respiratory: No cough or shortness of breath  Cardiovascular: No chest pain or palpitations  Gastrointestinal: No nausea or vomiting  Neurological: No confusion or weakness    OBJECTIVE:     Vital Signs (Most Recent)  Temp: 99.1 °F (37.3 °C) (12/08/17 1344)  Pulse: 81 (12/08/17 1515)  Resp: 16 (12/08/17 1515)  BP: 122/60 (12/08/17 1515)  SpO2: " 98 % (12/08/17 1515)    Vital Signs Range (Last 24H):  Temp:  [98.6 °F (37 °C)-99.1 °F (37.3 °C)]   Pulse:  [80-96]   Resp:  [16-20]   BP: (122-164)/(57-71)   SpO2:  [94 %-99 %]       Intake/Output Summary (Last 24 hours) at 12/08/17 1521  Last data filed at 12/08/17 1332   Gross per 24 hour   Intake              500 ml   Output                0 ml   Net              500 ml       Physical Exam:  General appearance: Well developed, well nourished  Eyes:  Conjunctivae/corneas clear. PERRL.  Legally blind  Lungs: Normal respiratory effort,   clear to auscultation bilaterally, diminished in bases  Heart: Regular rate and rhythm, S1, S2 normal, no murmur, rub or fatoumata.  Abdomen: Soft, non-tender non-distended; bowel sounds normal; no masses,  no organomegaly  Extremities: No cyanosis or clubbing. No edema.  ACE wrap and knee brace to right knee. Toe amputations noted  Skin: Skin color, texture, turgor normal. No rashes or lesions  Neurologic: Normal strength and tone. No focal numbness or weakness       Laboratory:    Reviewed    Diagnostic Results:      ASSESSMENT/PLAN:     1. Patella repair (S82.001A): per ortho and therapy teams  2. Leukocytosis (D72.829): noted on pre-op labs, patient reports +F/C for one week prior to surgery. Will await results of tomorrow's  Labs  3. Anemia (D64.9): will await results of tomorrow's labs. Asymptomatic at this time.  4. Hyponatremia (E87.1): noted on pre-op labs (130). Will add NS at 75ml/hr  and await tomorrow's labs  5. Hypertension (I12.9): meds with hold parameters  6. CKD 3 (N18.3): Renally dose meds, avoid nephrotoxins, and monitor I/O's closely.  7. T2DM (E11.9): long acting dose decreased to 20 units. Takes 28 at home. Mealtime SSI   8. Protein/Calorie malnutrition (E46): would encourage protein rich foods  9. 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 CAD (I25.10): continue statin and fenofibrate  11. RLS (G25.81): continue home med    Plan:See above  recommendations and orders. Will follow along.

## 2017-12-09 PROBLEM — S82.001A RIGHT PATELLA FRACTURE: Status: RESOLVED | Noted: 2017-12-08 | Resolved: 2017-12-09

## 2017-12-09 LAB
ANION GAP SERPL CALC-SCNC: 6 MMOL/L
ANISOCYTOSIS BLD QL SMEAR: SLIGHT
BASOPHILS # BLD AUTO: ABNORMAL K/UL
BASOPHILS NFR BLD: 0 %
BUN SERPL-MCNC: 21 MG/DL
CALCIUM SERPL-MCNC: 8.3 MG/DL
CHLORIDE SERPL-SCNC: 103 MMOL/L
CO2 SERPL-SCNC: 22 MMOL/L
CREAT SERPL-MCNC: 1.2 MG/DL
DIFFERENTIAL METHOD: ABNORMAL
EOSINOPHIL # BLD AUTO: ABNORMAL K/UL
EOSINOPHIL NFR BLD: 2 %
ERYTHROCYTE [DISTWIDTH] IN BLOOD BY AUTOMATED COUNT: 14.5 %
EST. GFR  (AFRICAN AMERICAN): >60 ML/MIN/1.73 M^2
EST. GFR  (NON AFRICAN AMERICAN): >60 ML/MIN/1.73 M^2
GLUCOSE SERPL-MCNC: 227 MG/DL
HCT VFR BLD AUTO: 25.3 %
HGB BLD-MCNC: 8.2 G/DL
HYPOCHROMIA BLD QL SMEAR: ABNORMAL
LYMPHOCYTES # BLD AUTO: ABNORMAL K/UL
LYMPHOCYTES NFR BLD: 11 %
MCH RBC QN AUTO: 27.6 PG
MCHC RBC AUTO-ENTMCNC: 32.4 G/DL
MCV RBC AUTO: 85 FL
METAMYELOCYTES NFR BLD MANUAL: 1 %
MONOCYTES # BLD AUTO: ABNORMAL K/UL
MONOCYTES NFR BLD: 15 %
NEUTROPHILS NFR BLD: 71 %
OVALOCYTES BLD QL SMEAR: ABNORMAL
PLATELET # BLD AUTO: 433 K/UL
PLATELET BLD QL SMEAR: ABNORMAL
PMV BLD AUTO: 8.5 FL
POCT GLUCOSE: 197 MG/DL (ref 70–110)
POCT GLUCOSE: 244 MG/DL (ref 70–110)
POCT GLUCOSE: 273 MG/DL (ref 70–110)
POCT GLUCOSE: 281 MG/DL (ref 70–110)
POIKILOCYTOSIS BLD QL SMEAR: SLIGHT
POLYCHROMASIA BLD QL SMEAR: ABNORMAL
POTASSIUM SERPL-SCNC: 4.2 MMOL/L
RBC # BLD AUTO: 2.97 M/UL
SODIUM SERPL-SCNC: 131 MMOL/L
WBC # BLD AUTO: 12.88 K/UL

## 2017-12-09 PROCEDURE — 80048 BASIC METABOLIC PNL TOTAL CA: CPT

## 2017-12-09 PROCEDURE — G8978 MOBILITY CURRENT STATUS: HCPCS | Mod: CH

## 2017-12-09 PROCEDURE — 97116 GAIT TRAINING THERAPY: CPT

## 2017-12-09 PROCEDURE — 85007 BL SMEAR W/DIFF WBC COUNT: CPT

## 2017-12-09 PROCEDURE — 94761 N-INVAS EAR/PLS OXIMETRY MLT: CPT

## 2017-12-09 PROCEDURE — 87040 BLOOD CULTURE FOR BACTERIA: CPT

## 2017-12-09 PROCEDURE — 97161 PT EVAL LOW COMPLEX 20 MIN: CPT

## 2017-12-09 PROCEDURE — 25000003 PHARM REV CODE 250: Performed by: INTERNAL MEDICINE

## 2017-12-09 PROCEDURE — 36415 COLL VENOUS BLD VENIPUNCTURE: CPT

## 2017-12-09 PROCEDURE — 94799 UNLISTED PULMONARY SVC/PX: CPT

## 2017-12-09 PROCEDURE — 25000003 PHARM REV CODE 250: Performed by: NURSE PRACTITIONER

## 2017-12-09 PROCEDURE — 87086 URINE CULTURE/COLONY COUNT: CPT

## 2017-12-09 PROCEDURE — G8979 MOBILITY GOAL STATUS: HCPCS | Mod: CH

## 2017-12-09 PROCEDURE — 63600175 PHARM REV CODE 636 W HCPCS: Performed by: NURSE PRACTITIONER

## 2017-12-09 PROCEDURE — 25000003 PHARM REV CODE 250: Performed by: ORTHOPAEDIC SURGERY

## 2017-12-09 PROCEDURE — 85027 COMPLETE CBC AUTOMATED: CPT

## 2017-12-09 PROCEDURE — 63700000 PHARM REV CODE 250 ALT 637 W/O HCPCS: Performed by: NURSE PRACTITIONER

## 2017-12-09 PROCEDURE — G8980 MOBILITY D/C STATUS: HCPCS | Mod: CH

## 2017-12-09 RX ORDER — CEPHALEXIN 500 MG/1
500 CAPSULE ORAL EVERY 12 HOURS
Status: DISCONTINUED | OUTPATIENT
Start: 2017-12-09 | End: 2017-12-10 | Stop reason: HOSPADM

## 2017-12-09 RX ORDER — OXYCODONE AND ACETAMINOPHEN 5; 325 MG/1; MG/1
1 TABLET ORAL EVERY 4 HOURS PRN
Qty: 45 TABLET | Refills: 0 | Status: SHIPPED | OUTPATIENT
Start: 2017-12-09 | End: 2018-04-09 | Stop reason: CLARIF

## 2017-12-09 RX ORDER — ACETAMINOPHEN 325 MG/1
650 TABLET ORAL EVERY 6 HOURS PRN
Status: DISCONTINUED | OUTPATIENT
Start: 2017-12-09 | End: 2017-12-10 | Stop reason: HOSPADM

## 2017-12-09 RX ADMIN — CEPHALEXIN 500 MG: 500 CAPSULE ORAL at 09:12

## 2017-12-09 RX ADMIN — FENOFIBRATE 160 MG: 160 TABLET ORAL at 09:12

## 2017-12-09 RX ADMIN — ACETAMINOPHEN 650 MG: 325 TABLET ORAL at 01:12

## 2017-12-09 RX ADMIN — METOPROLOL SUCCINATE 50 MG: 50 TABLET, EXTENDED RELEASE ORAL at 09:12

## 2017-12-09 RX ADMIN — SODIUM CHLORIDE: 0.9 INJECTION, SOLUTION INTRAVENOUS at 04:12

## 2017-12-09 RX ADMIN — CEPHALEXIN 500 MG: 500 CAPSULE ORAL at 11:12

## 2017-12-09 RX ADMIN — INSULIN ASPART 1 UNITS: 100 INJECTION, SOLUTION INTRAVENOUS; SUBCUTANEOUS at 09:12

## 2017-12-09 RX ADMIN — ATORVASTATIN CALCIUM 20 MG: 20 TABLET, FILM COATED ORAL at 09:12

## 2017-12-09 RX ADMIN — GABAPENTIN 100 MG: 100 CAPSULE ORAL at 09:12

## 2017-12-09 RX ADMIN — INSULIN ASPART 3 UNITS: 100 INJECTION, SOLUTION INTRAVENOUS; SUBCUTANEOUS at 05:12

## 2017-12-09 RX ADMIN — INSULIN ASPART 2 UNITS: 100 INJECTION, SOLUTION INTRAVENOUS; SUBCUTANEOUS at 11:12

## 2017-12-09 RX ADMIN — INSULIN DETEMIR 20 UNITS: 100 INJECTION, SOLUTION SUBCUTANEOUS at 09:12

## 2017-12-09 RX ADMIN — ROPINIROLE HYDROCHLORIDE 1 MG: 0.25 TABLET, FILM COATED ORAL at 09:12

## 2017-12-09 NOTE — PROGRESS NOTES
"Progress Note  Orthopedics    Admit Date: 12/8/2017   Patient ID: Luca Camarena III is a 62 y.o. male.      Patient doing well this am.  Awaiting PT for evaluation.  Temp over night. Resolved with Tylenol and IS.  Discharge today. Will Call Dr. Augustin Monday for followup apt.       Sheab Leach      Vital Sign (recent):  BP (!) 155/74 (Patient Position: Lying)   Pulse 91   Temp 99.3 °F (37.4 °C) (Oral)   Resp 16   Ht 5' 11" (1.803 m)   Wt 97.5 kg (215 lb)   SpO2 98%   BMI 29.99 kg/m²       Laboratory:    CBC:   Recent Labs  Lab 12/05/17  1052   WBC 15.24*   RBC 3.17*   HGB 8.8*   HCT 26.9*      MCV 85   MCH 27.8   MCHC 32.7       CMP:   Recent Labs  Lab 12/05/17  1052   *   CALCIUM 9.0   ALBUMIN 2.7*   PROT 6.9   *   K 4.6   CO2 27   CL 98   BUN 33*   CREATININE 2.0*   ALKPHOS 46*   ALT 19   AST 24   BILITOT 0.5           Intake/Output Summary (Last 24 hours) at 12/09/17 0830  Last data filed at 12/09/17 0602   Gross per 24 hour   Intake          1881.25 ml   Output             1000 ml   Net           881.25 ml         Current Medications:   atorvastatin  20 mg Oral Daily    fenofibrate  160 mg Oral Daily    gabapentin  100 mg Oral BID    insulin detemir  20 Units Subcutaneous QHS    metoprolol succinate  50 mg Oral Daily    rOPINIRole  1 mg Oral QHS       Continuous Infusions:   sodium chloride 0.9% 75 mL/hr at 12/09/17 0440     PRN Meds:.acetaminophen, dextrose 50%, dextrose 50%, glucagon (human recombinant), glucose, glucose, insulin aspart, oxyCODONE, oxyCODONE, sodium chloride 0.9%, sodium chloride 0.9%, sodium chloride 0.9%  "

## 2017-12-09 NOTE — PROGRESS NOTES
Pt running temperature of 101.5 and /79.  Notified Dr. De La Cruz answering service spoke with Daniel ORTEGA. As per Daniel ORTEGA,  administer Tylenol 650 mg every 6 hours PRN. Order IS for bedside.  Monitor BP and if remains high notify Nephrology. Will continue to monitor.       0220- Reassessed /61, Temp 100.4. Will continue to monitor.

## 2017-12-09 NOTE — PLAN OF CARE
Problem: Physical Therapy Goal  Goal: Physical Therapy Goal  No Goals  Pt d/c home  -    Comments: Physical therapy eval completed.  Pt d/c home with  PT.  Needs RW and BSC.

## 2017-12-09 NOTE — PLAN OF CARE
Problem: Patient Care Overview  Goal: Plan of Care Review  Pt max temp 100.9 at 1600. Up in chair all day.  Tolerating activity c PT and up c assistance. Knee immobilizer on at all times to rt knee. Good neuro checks to rt Le. Accu monitored and ss insulin coverage given. Pt denies pain today and denies need for pain meds. IV discontinued and order to leave out IV noted. Eating supper has no c/o .  RLA

## 2017-12-09 NOTE — NURSING
Pt scheduled to go home p seen by Pt. Discharge order noted. Pt seen by Dr Palma.  Order to hold discharge noted from Dr Stockton p speaking c Dr Palma. Explained to pt that he will not be discharged home today. Low grade temp noted. Left knee dressing dry and intact c good neuro check to foot.  Skin tears and abrasions noted to arms and rt elbow c laceration c sutures present. New mepilex dressing applied to rt elbow. Pt voiding and tolerating po well. Up ad kylie c rt knee brace on at all times.  RLA

## 2017-12-09 NOTE — PLAN OF CARE
[facility]       [address]       [facility] [address]       [phone]               [facility] Orthopedic Discharge Orders    Home [place]           Expected Discharge Date: 12/9    Diagnoses:  Post-op ORIF patella     Patient is homebound due to:   Pt requires home health services due to taxing effort to leave the home as a result of immobility from Post-op ORIF Patella   Weight Bearing Status:   full weight bearing: right leg      Physical Therapy   3 times a week   - Ambulate with a rolling walker  - Progress to cane  - Instruct on ROM and strengthening of knee    Aide to provide assistance with personal care and  ADLs  3 times a week    Wound Care:   If patient is discharged with aqua mary/silver dressing, leave on for 5 days unless saturated border to border, then follow instructions below:  Cleanse with wound cleanser or normal saline and apply Mepore Pro dressing.  If Mepore pro not available apply gauze and tegaderm.  Change 3 times a week or PRN if dry.  Teach patient to change daily if draining.             Call Dr. Augustin for any further orders.   On dressing change, apply new dressing while knee in flexed position.    Pt may shower if incision dressing has waterproof dressing in place. Removal and replacement of dressing after shower only needed if incision is suspected to have gotten wet during shower.  Otherwise change as previously described depending on dressing/drainage    No soaking in the tub or hot tub use. Cold therapy/Ice encouraged at least 20 minutes 2-3 times daily or more if desired.  Incision must be kept waterproof while icing.      FWB unless otherwise indicated.  Progress to cane as able.  Set up for outpatient PT as soon as able after staple removal once patient is MOD I with cane.    Outpatient Therapy: [facility]    [address] [address]   or  [address]  [place] [address]    Call [phone] to schedule  appointment  Fax [phone]    If need orders: Call Karlee at Ext [phone]      Wear TEDS Bilateral Thigh High Stockings for 3 weeks  Deirdre hose x 3 weeks. Ok to remove deirdre hose 1-2 hours/day max if desired.       DME:  - rolling Walker  - 3 in 1 commode  - tub bench / shower chair  - Per PT/OT recommendation          Sheba Leach

## 2017-12-09 NOTE — PLAN OF CARE
[facility]ified [facility] on call nursing staff, Diana to arrange home health. Faxed home health orders to intake at [phone]. Awaiting acceptance from on call. JENNIFFER Hudson    Notified [facility] of dme : bedside commode and rolling walker. Patient declines the cane for home use. Delivered rolling walker and bedside commode at the bedside, signed delivery ticket and given instructions for equipment. Tristan

## 2017-12-09 NOTE — PLAN OF CARE
Problem: Patient Care Overview  Goal: Plan of Care Review  Outcome: Ongoing (interventions implemented as appropriate)  Patient on RA. Sats 96%. Pt ordered IS q4hr  @ 01:48am. Pt. in no distress, will continue to monitor.

## 2017-12-09 NOTE — PT/OT/SLP EVAL
Physical Therapy Evaluation    Patient Name:  Luca Camarena III   MRN:  [id number]    Recommendations:     Discharge Recommendations:  home, home health PT, home with home health   Discharge Equipment Recommendations: bedside commode, walker, rolling   Barriers to discharge: None    Assessment:     Luca Camarena III is a 62 y.o. male admitted with a medical diagnosis of Right patella fracture.  He presents with the following impairments/functional limitations:  weakness, impaired endurance, gait instability, impaired balance, decreased lower extremity function, pain, impaired functional mobilty .  Pt now appears safe and independent with all functional mobility and d/c home.    Rehab Prognosis:  excellent; patient would benefit from  PT services to address these deficits and reach maximum level of function.      Recent Surgery: Procedure(s) (LRB):  OPEN REDUCTION INTERNAL FIXATION-PATELLA (Right) 1 Day Post-Op    Plan:     During this hospitalization, patient to be seen   to address the above listed problems via    · Plan of Care Expires:  01/09/18   Plan of Care Reviewed with: patient, spouse    Subjective     Communicated with patient and wife prior to session.  Patient found supine in bed upon PT entry to room, agreeable to evaluation.      Chief Complaint: R LE knee pain  Patient comments/goals: ready to go home and take care of chickens  Pain/Comfort:  · Pain Rating 1: 0/10  · Location - Side 1: Right  · Location 1: leg  · Pain Addressed 1: Pre-medicate for activity, Distraction  · Pain Rating Post-Intervention 1: 4/10    Patients cultural, spiritual, Scientologist conflicts given the current situation: none    Living Environment:lives with wife in  house with threshold step to enter.   Prior to admission, patients level of function was I.  Patient has the following equipment: cane, straight.  DME owned (not currently used): none.  Upon discharge, patient will have assistance from wife.    Objective:     Patient  found with: peripheral IV     General Precautions: Standard, fall   Orthopedic Precautions:  R knee WBAT  Braces: N/A     Exams:  · Cognitive Exam:  Patient is oriented to Person, Place, Time and Situation and follows 100% of verbal commands   · Fine Motor Coordination:    · -       Impaired  RLE heel shin -  · Gross Motor Coordination:  WFL  · Postural Exam:  Patient presented with the following abnormalities:    · -       Forward head  · Sensation:    · -       Impaired  light/touch B LE distal worse than proximal  · Skin Integrity/Edema:      · -       Skin integrity: Bruising of R UE and Tear of R UE  · RUE ROM: WFL  · RUE Strength: WFL  · LUE ROM: WFL  · LUE Strength: WFL  · RLE ROM: Deficits: 2/2 ortho restriction  · RLE Strength: WFL  · LLE ROM: WFL  · LLE Strength: WFL    Functional Mobility:  · Bed Mobility:     · Supine to Sit: modified independence  · Transfers:     · Sit to Stand:  modified independence with rolling walker  · Gait: amb 75' with RW Rosalina  · Balance: good- standing dynamic balance  .     Up/down curb step with RW mod I  AM-PAC 6 CLICK MOBILITY  Total Score:24       Patient left up in chair with all lines intact, call button in reach, nurse notified and wife present.    GOALS:    Physical Therapy Goals        Problem: Physical Therapy Goal    Goal Priority Disciplines Outcome Goal Variances Interventions   Physical Therapy Goal     PT/OT, PT      Description:  No Goals  Pt d/c home                    History:     Past Medical History:   Diagnosis Date    Anticoagulant long-term use     Coronary artery disease 2004    stents x 3    CRI (chronic renal insufficiency)     Diabetes mellitus     Diaphragm paralysis     Elbow wound 12/01/2017    s/p fall; 3 stitches    Fall 12/01/2017    Glaucoma     Hearing deficit     Hypertension     Legally blind     Restless leg        Past Surgical History:   Procedure Laterality Date    CORONARY STENT PLACEMENT  2004    EYE SURGERY Bilateral      cataract and vitrectomy    foot surgery Bilateral     toe amputations    GASTRIC BYPASS  2011    KNEE SURGERY Right     x 2    ROTATOR CUFF REPAIR Bilateral     TONSILLECTOMY         Clinical Decision Making:     History  Co-morbidities and personal factors that may impact the plan of care Examination  Body Structures and Functions, activity limitations and participation restrictions that may impact the plan of care Clinical Presentation   Decision Making/ Complexity Score   Co-morbidities:   [] Time since onset of injury / illness / exacerbation  [] Status of current condition  []Patient's cognitive status and safety concerns    [] Multiple Medical Problems (see med hx)  Personal Factors:   [] Patient's age  [] Prior Level of function   [] Patient's home situation (environment and family support)  [] Patient's level of motivation  [] Expected progression of patient      HISTORY:(criteria)    [] 21490 - no personal factors/history    [] 12790 - has 1-2 personal factor/comorbidity     [] 06116 - has >3 personal factor/comorbidity     Body Regions:  [] Objective examination findings  [] Head     []  Neck  [] Trunk   [] Upper Extremity  [] Lower Extremity    Body Systems:  [] For communication ability, affect, cognition, language, and learning style: the assessment of the ability to make needs known, consciousness, orientation (person, place, and time), expected emotional /behavioral responses, and learning preferences (eg, learning barriers, education  needs)  [] For the neuromuscular system: a general assessment of gross coordinated movement (eg, balance, gait, locomotion, transfers, and transitions) and motor function  (motor control and motor learning)  [] For the musculoskeletal system: the assessment of gross symmetry, gross range of motion, gross strength, height, and weight  [] For the integumentary system: the assessment of pliability(texture), presence of scar formation, skin color, and skin  integrity  [] For cardiovascular/pulmonary system: the assessment of heart rate, respiratory rate, blood pressure, and edema     Activity limitations:    [] Patient's cognitive status and saf ety concerns          [] Status of current condition      [] Weight bearing restriction  [] Cardiopulmunary Restriction    Participation Restrictions:   [] Goals and goal agreement with the patient     [] Rehab potential (prognosis) and probable outcome      Examination of Body System: (criteria)    [] 55102 - addressing 1-2 elements    [] 34066 - addressing a total of 3 or more elements     [] 20211 -  Addressing a total of 4 or more elements         Clinical Presentation: (criteria)  Choose one     On examination of body system using standardized tests and measures patient presents with (CHOOSE ONE) elements from any of the following: body structures and functions, activity limitations, and/or participation restrictions.  Leading to a clinical presentation that is considered (CHOOSE ONE)                              Clinical Decision Making  (Eval Complexity):  Choose One     Time Tracking:     PT Received On: 12/09/17  PT Start Time: 1327     PT Stop Time: 1410  PT Total Time (min): 43 min     Billable Minutes: Evaluation 28 and Gait Training 15      Robert Laura, PT  12/09/2017

## 2017-12-09 NOTE — PROGRESS NOTES
Renal Progress Note    Admit Date: 12/8/2017   LOS: 1 day     SUBJECTIVE:     Patient complained that right elbow wound dressing had not yet been changed.  I called nurse to do so.  Wife at bedside.  They hope to be discharged to home today if okay per ortho.  Had temp to 101.5 last night.  Now afebrile    Scheduled Meds:   atorvastatin  20 mg Oral Daily    fenofibrate  160 mg Oral Daily    gabapentin  100 mg Oral BID    insulin detemir  20 Units Subcutaneous QHS    metoprolol succinate  50 mg Oral Daily    rOPINIRole  1 mg Oral QHS       OBJECTIVE:     Vital Signs Range (Last 24H):  Temp:  [98.4 °F (36.9 °C)-101.5 °F (38.6 °C)]   Pulse:  [80-98]   Resp:  [16-19]   BP: (119-189)/(57-82)   SpO2:  [94 %-99 %]     I & O (Last 24H):  Intake/Output Summary (Last 24 hours) at 12/09/17 1042  Last data filed at 12/09/17 0602   Gross per 24 hour   Intake          1881.25 ml   Output             1000 ml   Net           881.25 ml       Physical Exam:  General appearance: Well developed, well nourished  Eyes:  Conjunctivae/corneas clear. PERRL.  Legally blind  Lungs: Normal respiratory effort,   clear to auscultation bilaterally, diminished in bases  Heart: Regular rate and rhythm, S1, S2 normal, no murmur, rub or fatoumata.  Abdomen: Soft, non-tender non-distended; bowel sounds normal; no masses,  no organomegaly  Extremities: No cyanosis or clubbing. No edema.  ACE wrap and knee brace to right knee. Toe amputations noted  Skin: Skin color, texture, turgor normal. Mulitple skin lacerations of elbows, shins from recent fall - no evidence of active infection  Neurologic: Normal strength and tone. No focal numbness or weakness      Laboratory:  CBC:   Recent Labs  Lab 12/05/17  1052   WBC 15.24*   RBC 3.17*   HGB 8.8*   HCT 26.9*      MCV 85   MCH 27.8   MCHC 32.7     BMP:   Recent Labs  Lab 12/05/17  1052   *   *   K 4.6   CL 98   CO2 27   BUN 33*   CREATININE 2.0*   CALCIUM 9.0       ASSESSMENT/PLAN:      1. S/P Patellar repair (S82.001A): per ortho and therapy teams  2. Pre-Op Leukocytosis (D72.829) and fever overnight: Patient reports +F/C for one week prior to surgery. Too early to be related to surgery - would check blood cultures, urine cultures, start emperic Keflex given multiple excoriations related to fall. Lung exam clear but will check CXR if persists.  3. Anemia (D64.9): will await results of today's labs. Asymptomatic at this time.  4. Hyponatremia (E87.1): noted on pre-op labs (130). Will add NS at 75ml/hr  and await tomorrow's labs  5. Hypertension (I12.9): meds with hold parameters  6. CKD 3 (N18.3): Renally dose meds, avoid nephrotoxins, and monitor I/O's closely.  7. T2DM (E11.9): long acting dose decreased to 20 units. Takes 28 at home. Mealtime SSI   8. Protein/Calorie malnutrition (E46): would encourage protein rich foods  9. 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 CAD (I25.10): continue statin and fenofibrate  11. RLS (G25.81): continue home med.    Labs not drawn this am will draw stat.

## 2017-12-09 NOTE — PLAN OF CARE
Problem: Patient Care Overview  Goal: Plan of Care Review  Outcome: Ongoing (interventions implemented as appropriate)  Pt remains free from injury or falls. Blood pressure and temperature monitored throughout night on room air. Positions with assist. No complaints of pain or nausea, Tylenol administered for fever. Dressing and brace to RLE. Bed alarm set, bed in low locked position and call light within reach.  Will continue to monitor.

## 2017-12-10 VITALS
OXYGEN SATURATION: 98 % | BODY MASS INDEX: 30.1 KG/M2 | DIASTOLIC BLOOD PRESSURE: 60 MMHG | WEIGHT: 215 LBS | SYSTOLIC BLOOD PRESSURE: 131 MMHG | RESPIRATION RATE: 16 BRPM | HEIGHT: 71 IN | HEART RATE: 82 BPM | TEMPERATURE: 99 F

## 2017-12-10 LAB — BACTERIA UR CULT: NO GROWTH

## 2017-12-10 PROCEDURE — 63700000 PHARM REV CODE 250 ALT 637 W/O HCPCS: Performed by: NURSE PRACTITIONER

## 2017-12-10 PROCEDURE — 25000003 PHARM REV CODE 250: Performed by: INTERNAL MEDICINE

## 2017-12-10 PROCEDURE — 25000003 PHARM REV CODE 250: Performed by: NURSE PRACTITIONER

## 2017-12-10 PROCEDURE — 25000003 PHARM REV CODE 250: Performed by: ORTHOPAEDIC SURGERY

## 2017-12-10 RX ADMIN — ACETAMINOPHEN 650 MG: 325 TABLET ORAL at 10:12

## 2017-12-10 RX ADMIN — FENOFIBRATE 160 MG: 160 TABLET ORAL at 09:12

## 2017-12-10 RX ADMIN — GABAPENTIN 100 MG: 100 CAPSULE ORAL at 09:12

## 2017-12-10 RX ADMIN — METOPROLOL SUCCINATE 50 MG: 50 TABLET, EXTENDED RELEASE ORAL at 09:12

## 2017-12-10 RX ADMIN — CEPHALEXIN 500 MG: 500 CAPSULE ORAL at 09:12

## 2017-12-10 RX ADMIN — ATORVASTATIN CALCIUM 20 MG: 20 TABLET, FILM COATED ORAL at 09:12

## 2017-12-10 NOTE — NURSING
Pt seen by Dr Stockton today and states OK to discharge home if OK c Dr Palma. Spoke c dr Palma and states OK to discharge pt home and prescription called to pharmacy. Pt instructed to follow up c emi Lowry. New Instructions given to pt and wife.  RODGER

## 2017-12-10 NOTE — PROGRESS NOTES
"Progress Note  Orthopedics    Admit Date: 12/8/2017   Patient ID: Luca Camarena III is a 62 y.o. male. Doing well ,dressing dry.  Afeb.  OK for DC from Ortho standpoint.  Evaristo Augustin.            Chano Stockton      Vital Sign (recent):  /60 (Patient Position: Sitting)   Pulse 82   Temp 98.7 °F (37.1 °C) (Oral)   Resp 16   Ht 5' 11" (1.803 m)   Wt 97.5 kg (215 lb)   SpO2 98%   BMI 29.99 kg/m²       Laboratory:    CBC:   Recent Labs  Lab 12/09/17  1129   WBC 12.88*   RBC 2.97*   HGB 8.2*   HCT 25.3*   *   MCV 85   MCH 27.6   MCHC 32.4       CMP:   Recent Labs  Lab 12/05/17  1052 12/09/17  1129   * 227*   CALCIUM 9.0 8.3*   ALBUMIN 2.7*  --    PROT 6.9  --    * 131*   K 4.6 4.2   CO2 27 22*   CL 98 103   BUN 33* 21   CREATININE 2.0* 1.2   ALKPHOS 46*  --    ALT 19  --    AST 24  --    BILITOT 0.5  --            Intake/Output Summary (Last 24 hours) at 12/10/17 0938  Last data filed at 12/09/17 2130   Gross per 24 hour   Intake                0 ml   Output              400 ml   Net             -400 ml         Current Medications:   atorvastatin  20 mg Oral Daily    cephALEXin  500 mg Oral Q12H    fenofibrate  160 mg Oral Daily    gabapentin  100 mg Oral BID    insulin detemir  20 Units Subcutaneous QHS    metoprolol succinate  50 mg Oral Daily    rOPINIRole  1 mg Oral QHS       Continuous Infusions:   PRN Meds:.acetaminophen, dextrose 50%, dextrose 50%, glucagon (human recombinant), glucose, glucose, insulin aspart, oxyCODONE, oxyCODONE, sodium chloride 0.9%, sodium chloride 0.9%, sodium chloride 0.9%  "

## 2017-12-10 NOTE — PLAN OF CARE
Problem: Patient Care Overview  Goal: Plan of Care Review  Outcome: Ongoing (interventions implemented as appropriate)  Pt in no distress on Ra, IS done. Will continue to monitor.

## 2017-12-10 NOTE — DISCHARGE INSTRUCTIONS
Keflex 500 mg at [facility] [address]. Take 500mg  Two times a day. Follow up with primary DrBettye regarding skin laceration and fevers.    Discharge Instructions: After Your Surgery  Youve just had surgery. During surgery, you were given medicine called anesthesia to keep you relaxed and free of pain. After surgery, you may have some pain or nausea. This is common. Here are some tips for feeling better and getting well after surgery.     Stay on schedule with your medicine.     Going home  Your healthcare provider will show you how to take care of yourself when you go home. He or she will also answer your questions. Have an adult family member or friend drive you home. For the first 24 hours after your surgery:    · Do not drive or use heavy equipment.  · Do not make important decisions or sign legal papers.  · Do not drink alcohol.  · Have someone stay with you, if needed. He or she can watch for problems and help keep you safe.    Be sure to go to all follow-up visits with your healthcare provider. And rest after your surgery for as long as your healthcare provider tells you to.    Coping with pain  If you have pain after surgery, pain medicine will help you feel better. Take it as told, before pain becomes severe. Also, ask your healthcare provider or pharmacist about other ways to control pain. This might be with heat, ice, or relaxation. And follow any other instructions your surgeon or nurse gives you.    Tips for taking pain medicine  To get the best relief possible, remember these points:    · Pain medicines can upset your stomach. Taking them with a little food may help.  · Most pain relievers taken by mouth need at least 20 to 30 minutes to start to work.  · Taking medicine on a schedule can help you remember to take it. Try to time your medicine so that you can take it before starting an activity. This might be before you get dressed, go for a walk, or sit down for dinner.  · Constipation is a  common side effect of pain medicines. Call your healthcare provider before taking any medicines such as laxatives or stool softeners to help ease constipation. Also ask if you should skip any foods. Drinking lots of fluids and eating foods such as fruits and vegetables that are high in fiber can also help. Remember, do not take laxatives unless your surgeon has prescribed them.  · Drinking alcohol and taking pain medicine can cause dizziness and slow your breathing. It can even be deadly. Do not drink alcohol while taking pain medicine.  · Pain medicine can make you react more slowly to things. Do not drive or run machinery while taking pain medicine.    Your healthcare provider may tell you to take acetaminophen to help ease your pain. Ask him or her how much you are supposed to take each day. Acetaminophen or other pain relievers may interact with your prescription medicines or other over-the-counter (OTC) medicines. Some prescription medicines have acetaminophen and other ingredients. Using both prescription and OTC acetaminophen for pain can cause you to overdose. Read the labels on your OTC medicines with care. This will help you to clearly know the list of ingredients, how much to take, and any warnings. It may also help you not take too much acetaminophen. If you have questions or do not understand the information, ask your pharmacist or healthcare provider to explain it to you before you take the OTC medicine.    Managing nausea  Some people have an upset stomach after surgery. This is often because of anesthesia, pain, or pain medicine, or the stress of surgery. These tips will help you handle nausea and eat healthy foods as you get better. If you were on a special food plan before surgery, ask your healthcare provider if you should follow it while you get better. These tips may help:    · Do not push yourself to eat. Your body will tell you when to eat and how much.  · Start off with clear liquids and  soup. They are easier to digest.  · Next try semi-solid foods, such as mashed potatoes, applesauce, and gelatin, as you feel ready.  · Slowly move to solid foods. Dont eat fatty, rich, or spicy foods at first.  · Do not force yourself to have 3 large meals a day. Instead eat smaller amounts more often.  · Take pain medicines with a small amount of solid food, such as crackers or toast, to avoid nausea.     Call your surgeon if  · You still have pain an hour after taking medicine. The medicine may not be strong enough.  · You feel too sleepy, dizzy, or groggy. The medicine may be too strong.  · You have side effects like nausea, vomiting, or skin changes, such as rash, itching, or hives.       If you have obstructive sleep apnea  You were given anesthesia medicine during surgery to keep you comfortable and free of pain. After surgery, you may have more apnea spells because of this medicine and other medicines you were given. The spells may last longer than usual.   At home:    · Keep using the continuous positive airway pressure (CPAP) device when you sleep. Unless your healthcare provider tells you not to, use it when you sleep, day or night. CPAP is a common device used to treat obstructive sleep apnea.  · Talk with your provider before taking any pain medicine, muscle relaxants, or sedatives. Your provider will tell you about the possible dangers of taking these medicines.    © 4917-5272 [employer]. [address] [place] [address], [place] [address]. All rights reserved. This information is not intended as a substitute for professional medical care. Always follow your healthcare professional's instructions.    PLEASE FOLLOW ANY OTHER INSTRUCTIONS PROVIDED TO YOU BY DR. ELLIOTT!

## 2017-12-10 NOTE — PROGRESS NOTES
Renal Progress Note    Admit Date: 12/8/2017   LOS: 1 day     SUBJECTIVE:     Patient is without new complaint.  Really wants to go home.  Low grade fever overnight to 100.9.  WBC's trending down.  Cultures NG.    Scheduled Meds:    OBJECTIVE:     Vital Signs Range (Last 24H):  Temp:  [98.7 °F (37.1 °C)-100.9 °F (38.3 °C)]   Pulse:  [82-91]   Resp:  [16-20]   BP: (131-172)/(60-74)   SpO2:  [96 %-99 %]     I & O (Last 24H):  Intake/Output Summary (Last 24 hours) at 12/10/17 1539  Last data filed at 12/09/17 2130   Gross per 24 hour   Intake                0 ml   Output              400 ml   Net             -400 ml       Physical Exam:  Unchanged  General appearance: Well developed, well nourished  Eyes:  Conjunctivae/corneas clear. PERRL.  Legally blind  Lungs: Normal respiratory effort,   clear to auscultation bilaterally, diminished in bases  Heart: Regular rate and rhythm, S1, S2 normal, no murmur, rub or fatoumata.  Abdomen: Soft, non-tender non-distended; bowel sounds normal; no masses,  no organomegaly  Extremities: No cyanosis or clubbing. No edema.  ACE wrap and knee brace to right knee. Toe amputations noted  Skin: Skin color, texture, turgor normal. Mulitple skin lacerations of elbows, shins from recent fall - no evidence of active infection  Neurologic: Normal strength and tone. No focal numbness or weakness     Laboratory:  CBC:   Recent Labs  Lab 12/09/17  1129   WBC 12.88*   RBC 2.97*   HGB 8.2*   HCT 25.3*   *   MCV 85   MCH 27.6   MCHC 32.4     BMP:   Recent Labs  Lab 12/09/17  1129   *   *   K 4.2      CO2 22*   BUN 21   CREATININE 1.2   CALCIUM 8.3*       ASSESSMENT/PLAN:     1. S/P Patellar repair (S82.001A): per ortho and therapy teams  2. Pre-Op Leukocytosis (D72.829) and fever overnight: Patient reports +F/C for one week prior to surgery. Ordered blood cultures, urine cultures, started emperic Keflex.  Low grade fever over night but blood Cx NG.  I think its ok for  discharge today but he must take an additional 7 days of Keflex 500 mg BID which I called into his pharmacy.   3. Anemia (D64.9): H/H noted. Asymptomatic at this time.  4. Hyponatremia (E87.1): noted on pre-op labs (130). Now 131 likely due to mild volume depletion.  5. Hypertension (I12.9): meds with hold parameters  6. CKD 3 (N18.3): Renally dose meds, avoid nephrotoxins, and monitor I/O's closely.  7. T2DM (E11.9): long acting dose decreased to 20 units. Takes 28 at home. Mealtime SSI   8. Protein/Calorie malnutrition (E46): would encourage protein rich foods  9. 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 CAD (I25.10): continue statin and fenofibrate  11. RLS (G25.81): continue home med.

## 2017-12-11 LAB — POCT GLUCOSE: 98 MG/DL (ref 70–110)

## 2017-12-14 LAB — BACTERIA BLD CULT: NORMAL

## 2018-04-09 ENCOUNTER — ANESTHESIA EVENT (OUTPATIENT)
Dept: SURGERY | Facility: OTHER | Age: 63
End: 2018-04-09
Payer: MEDICARE

## 2018-04-09 ENCOUNTER — HOSPITAL ENCOUNTER (OUTPATIENT)
Dept: PREADMISSION TESTING | Facility: OTHER | Age: 63
Discharge: HOME | End: 2018-04-09
Attending: ORTHOPAEDIC SURGERY
Payer: MEDICARE

## 2018-04-09 VITALS
TEMPERATURE: 98 F | OXYGEN SATURATION: 100 % | HEART RATE: 90 BPM | WEIGHT: 199 LBS | RESPIRATION RATE: 16 BRPM | HEIGHT: 71 IN | DIASTOLIC BLOOD PRESSURE: 74 MMHG | SYSTOLIC BLOOD PRESSURE: 137 MMHG | BODY MASS INDEX: 27.86 KG/M2

## 2018-04-09 LAB
ABO + RH BLD: NORMAL
BLD GP AB SCN CELLS X3 SERPL QL: NORMAL

## 2018-04-09 PROCEDURE — 86850 RBC ANTIBODY SCREEN: CPT

## 2018-04-09 PROCEDURE — 36415 COLL VENOUS BLD VENIPUNCTURE: CPT

## 2018-04-09 RX ORDER — SODIUM CHLORIDE, SODIUM LACTATE, POTASSIUM CHLORIDE, CALCIUM CHLORIDE 600; 310; 30; 20 MG/100ML; MG/100ML; MG/100ML; MG/100ML
INJECTION, SOLUTION INTRAVENOUS CONTINUOUS
Status: CANCELLED | OUTPATIENT
Start: 2018-04-09

## 2018-04-09 RX ORDER — LIDOCAINE HYDROCHLORIDE 10 MG/ML
0.5 INJECTION, SOLUTION EPIDURAL; INFILTRATION; INTRACAUDAL; PERINEURAL ONCE
Status: CANCELLED | OUTPATIENT
Start: 2018-04-09 | End: 2018-04-09

## 2018-04-09 RX ORDER — LORATADINE 10 MG/1
10 TABLET ORAL DAILY PRN
COMMUNITY

## 2018-04-09 NOTE — DISCHARGE INSTRUCTIONS
PRE-ADMIT TESTING -  [phone]    [address]  [place] [facility]          Your surgery has been scheduled at [facility]. We are pleased to have the opportunity to serve you. For Further Information please call [phone].    On the day of surgery please report to the Information Desk on the 1st floor.    · CONTACT YOUR PHYSICIAN'S OFFICE THE DAY PRIOR TO YOUR SURGERY TO OBTAIN YOUR ARRIVAL TIME.     · The evening before surgery do not eat anything after 9 p.m. ( this includes hard candy, chewing gum and mints).  You may only have GATORADE, POWERADE AND WATER  from 9 p.m. until you leave your home.   DO NOT DRINK ANY LIQUIDS ON THE WAY TO THE HOSPITAL.      SPECIAL MEDICATION INSTRUCTIONS: TAKE medications checked off by the Anesthesiologist on your Medication List.    Angiogram Patients: Take medications as instructed by your physician, including aspirin.     Surgery Patients:    If you take ASPIRIN - Your PHYSICIAN/SURGEON will need to inform you IF/OR when you need to stop taking aspirin prior to your surgery.     Do Not take any medications containing IBUPROFEN.  Do Not Wear any make-up or dark nail polish   (especially eye make-up) to surgery. If you come to surgery with makeup on you will be required to remove the makeup or nail polish.    Do not shave your surgical area at least 5 days prior to your surgery. The surgical prep will be performed at the hospital according to Infection Control regulations.    Leave all valuables at home.   Do Not wear any jewelry or watches, including any metal in body piercings.  Contact Lens must be removed before surgery. Either do not wear the contact lens or bring a case and solution for storage.  Please bring a container for eyeglasses or dentures as required.  Bring any paperwork your physician has provided, such as consent forms,  history and physicals, doctor's orders, etc.   Bring comfortable clothes that are loose fitting to wear upon  discharge. Take into consideration the type of surgery being performed.  Maintain your diet as advised per your physician the day prior to surgery.      Adequate rest the night before surgery is advised.   Park in the Parking lot behind the hospital or in the [facility] across the street from the parking lot. Parking is complimentary.  If you will be discharged the same day as your procedure, please arrange for a responsible adult to drive you home or to accompany you if traveling by taxi.   YOU WILL NOT BE PERMITTED TO DRIVE OR TO LEAVE THE HOSPITAL ALONE AFTER SURGERY.   It is strongly recommended that you arrange for someone to remain with you for the first 24 hrs following your surgery.       Thank you for your cooperation.  The Staff of [facility].        Bathing Instructions                                                                 Please shower the evening before and morning of your procedure with    ANTIBACTERIAL SOAP. ( DIAL, etc )  Concentrate on the surgical area   for at least 3 minutes and rinse completely. Dry off as usual.   Do not use any deodorant, powder, body lotions, perfume, after shave or    cologne.

## 2018-04-09 NOTE — ANESTHESIA PREPROCEDURE EVALUATION
04/09/2018  Luca Camarena III is a 63 y.o., male.    Pre-op Assessment    I have reviewed the Patient Summary Reports.     I have reviewed the Nursing Notes.   I have reviewed the Medications.     Review of Systems  Anesthesia Hx:  No problems with previous Anesthesia  History of prior surgery of interest to airway management or planning: Previous anesthesia: General Partial foot amp 2 yrs ago with general anesthesia.  Denies Family Hx of Anesthesia complications.   Denies Personal Hx of Anesthesia complications.   Social:  Non-Smoker    Hematology/Oncology:     Oncology Normal    -- Anemia: Hematology Comments: H/H 8/25 after procedure in December.  Will recheck.  Pt seeing hematology for work-up.  Pt on Iron.    Hematology also evaluating intermittent fevers pt has regularly since December 2017.    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   Renal/:   Chronic Renal Disease, CRI Followed by Nephrology for CRI   Hepatic/GI:  Hepatic/GI Normal    Musculoskeletal:   Arthritis     Neurological:   Neuromuscular Disease,    Endocrine:   Diabetes, type 1, using insulin    Psych:  Psychiatric Normal           Physical Exam  General:  Obesity    Airway/Jaw/Neck:  Airway Findings: Mouth Opening: Normal Tongue: Normal  Mallampati: II      Dental:  Dental Findings: Periodontal disease, Mild, molar caps        Mental Status:  Mental Status Findings:  Cooperative, Alert and Oriented         Anesthesia Plan  Type of Anesthesia, risks & benefits discussed:  Anesthesia Type:  general  Patient's Preference:   Intra-op Monitoring Plan:   Intra-op Monitoring Plan Comments:   Post Op Pain Control Plan:   Post Op Pain  Control Plan Comments:   Induction:   IV  Beta Blocker:         Informed Consent: Patient understands risks and agrees with Anesthesia plan.  Questions answered. Anesthesia consent signed with patient.  ASA Score: 3     Day of Surgery Review of History & Physical:    H&P update referred to the surgeon.     Anesthesia Plan Notes: Pt had GA for last knee surgery due plavix.  No problems.            Ready For Surgery From Anesthesia Perspective.

## 2018-04-09 NOTE — H&P
Subjective:     Status post failure fixation right patella.  Extremely high risk patient with medical conditions.  Difficulty with skin cannot even wear a brace.  Extensor mechanism failure from a subsequent fall.  Unable to extend the knee.  Admitted for revision extensor mechanism    There are no active problems to display for this patient.    Past Medical History:   Diagnosis Date    Anticoagulant long-term use     Coronary artery disease 2004    stents x 3    CRI (chronic renal insufficiency)     Diabetes mellitus     Diaphragm paralysis     Elbow wound 12/01/2017    s/p fall; 3 stitches    Fall 12/01/2017    Fever     Glaucoma     Hearing deficit     Hypertension     Legally blind     Restless leg       Past Surgical History:   Procedure Laterality Date    CORONARY STENT PLACEMENT  2004    EYE SURGERY Bilateral     cataract and vitrectomy    foot surgery Bilateral     toe amputations    GASTRIC BYPASS  2011    JOINT REPLACEMENT      KNEE SURGERY Right     x 2    ROTATOR CUFF REPAIR Bilateral     TONSILLECTOMY        No prescriptions prior to admission.     Review of patient's allergies indicates:  No Known Allergies   Social History   Substance Use Topics    Smoking status: Never Smoker    Smokeless tobacco: Never Used    Alcohol use Yes      Comment: rare      No family history on file.   Review of Systems  Pertinent items are noted in HPI.    Objective:     No data found.    Skin cool skin.  Extensor mechanism disruption.  Heart regular lungs clear    Imaging Review  Patella separation    Assessment:     There are no hospital problems to display for this patient.      Plan:     The various methods of treatment have been discussed with the patient and family.   After consideration of risks, benefits and other options for treatment, the patient has consented to surgical interventions (Severe risks on multiple levels).  Questions were answered and Pre-op teaching was done by me.

## 2018-04-10 NOTE — PRE ADMISSION SCREENING
Second message left for Mirna at Dr Ochoa office re: outside labs approved per anesthesia and Plavix. Awaiting response.

## 2018-04-11 NOTE — PRE ADMISSION SCREENING
Dr Augustin's office called . Message left for Mirna again to clarify Dr Augustin is aware pt is on Plavix.

## 2018-04-12 ENCOUNTER — HOSPITAL ENCOUNTER (OUTPATIENT)
Facility: OTHER | Age: 63
Discharge: HOME | End: 2018-04-13
Attending: ORTHOPAEDIC SURGERY | Admitting: ORTHOPAEDIC SURGERY
Payer: MEDICARE

## 2018-04-12 ENCOUNTER — ANESTHESIA (OUTPATIENT)
Dept: SURGERY | Facility: OTHER | Age: 63
End: 2018-04-12
Payer: MEDICARE

## 2018-04-12 DIAGNOSIS — L08.9 POST-TRAUMATIC WOUND INFECTION: Primary | ICD-10-CM

## 2018-04-12 DIAGNOSIS — S82.009A PATELLA FRACTURE: ICD-10-CM

## 2018-04-12 DIAGNOSIS — T14.8XXA POST-TRAUMATIC WOUND INFECTION: Primary | ICD-10-CM

## 2018-04-12 LAB
POCT GLUCOSE: 168 MG/DL (ref 70–110)
POCT GLUCOSE: 259 MG/DL (ref 70–110)
POCT GLUCOSE: 322 MG/DL (ref 70–110)
POCT GLUCOSE: 99 MG/DL (ref 70–110)

## 2018-04-12 PROCEDURE — 25000003 PHARM REV CODE 250: Performed by: ANESTHESIOLOGY

## 2018-04-12 PROCEDURE — 36000711: Performed by: ORTHOPAEDIC SURGERY

## 2018-04-12 PROCEDURE — 36000710: Performed by: ORTHOPAEDIC SURGERY

## 2018-04-12 PROCEDURE — 25000003 PHARM REV CODE 250: Performed by: ORTHOPAEDIC SURGERY

## 2018-04-12 PROCEDURE — 63600175 PHARM REV CODE 636 W HCPCS: Performed by: NURSE PRACTITIONER

## 2018-04-12 PROCEDURE — 97530 THERAPEUTIC ACTIVITIES: CPT

## 2018-04-12 PROCEDURE — 63600175 PHARM REV CODE 636 W HCPCS: Performed by: NURSE ANESTHETIST, CERTIFIED REGISTERED

## 2018-04-12 PROCEDURE — 25000003 PHARM REV CODE 250: Performed by: NURSE ANESTHETIST, CERTIFIED REGISTERED

## 2018-04-12 PROCEDURE — 71000033 HC RECOVERY, INTIAL HOUR: Performed by: ORTHOPAEDIC SURGERY

## 2018-04-12 PROCEDURE — 63600175 PHARM REV CODE 636 W HCPCS: Performed by: ORTHOPAEDIC SURGERY

## 2018-04-12 PROCEDURE — G8979 MOBILITY GOAL STATUS: HCPCS | Mod: CJ

## 2018-04-12 PROCEDURE — 37000008 HC ANESTHESIA 1ST 15 MINUTES: Performed by: ORTHOPAEDIC SURGERY

## 2018-04-12 PROCEDURE — 97161 PT EVAL LOW COMPLEX 20 MIN: CPT

## 2018-04-12 PROCEDURE — 87075 CULTR BACTERIA EXCEPT BLOOD: CPT

## 2018-04-12 PROCEDURE — 97116 GAIT TRAINING THERAPY: CPT | Mod: 59

## 2018-04-12 PROCEDURE — 87070 CULTURE OTHR SPECIMN AEROBIC: CPT

## 2018-04-12 PROCEDURE — C1713 ANCHOR/SCREW BN/BN,TIS/BN: HCPCS | Performed by: ORTHOPAEDIC SURGERY

## 2018-04-12 PROCEDURE — G8980 MOBILITY D/C STATUS: HCPCS | Mod: CK

## 2018-04-12 PROCEDURE — 63600175 PHARM REV CODE 636 W HCPCS: Performed by: ANESTHESIOLOGY

## 2018-04-12 PROCEDURE — 71000039 HC RECOVERY, EACH ADD'L HOUR: Performed by: ORTHOPAEDIC SURGERY

## 2018-04-12 PROCEDURE — 94761 N-INVAS EAR/PLS OXIMETRY MLT: CPT

## 2018-04-12 PROCEDURE — 37000009 HC ANESTHESIA EA ADD 15 MINS: Performed by: ORTHOPAEDIC SURGERY

## 2018-04-12 PROCEDURE — 82962 GLUCOSE BLOOD TEST: CPT | Performed by: ORTHOPAEDIC SURGERY

## 2018-04-12 PROCEDURE — G8978 MOBILITY CURRENT STATUS: HCPCS | Mod: CK

## 2018-04-12 PROCEDURE — 99900035 HC TECH TIME PER 15 MIN (STAT)

## 2018-04-12 PROCEDURE — 25000003 PHARM REV CODE 250: Performed by: NURSE PRACTITIONER

## 2018-04-12 PROCEDURE — 27201423 OPTIME MED/SURG SUP & DEVICES STERILE SUPPLY: Performed by: ORTHOPAEDIC SURGERY

## 2018-04-12 DEVICE — PIN GUIDE GRAFT PASS XACTPIN: Type: IMPLANTABLE DEVICE | Site: KNEE | Status: FUNCTIONAL

## 2018-04-12 RX ORDER — MORPHINE SULFATE 4 MG/ML
4 INJECTION, SOLUTION INTRAMUSCULAR; INTRAVENOUS
Status: DISCONTINUED | OUTPATIENT
Start: 2018-04-12 | End: 2018-04-13 | Stop reason: HOSPADM

## 2018-04-12 RX ORDER — NEOSTIGMINE METHYLSULFATE 1 MG/ML
INJECTION, SOLUTION INTRAVENOUS
Status: DISCONTINUED | OUTPATIENT
Start: 2018-04-12 | End: 2018-04-12

## 2018-04-12 RX ORDER — CEFAZOLIN SODIUM 1 G/3ML
2 INJECTION, POWDER, FOR SOLUTION INTRAMUSCULAR; INTRAVENOUS
Status: COMPLETED | OUTPATIENT
Start: 2018-04-12 | End: 2018-04-12

## 2018-04-12 RX ORDER — ROPIVACAINE HYDROCHLORIDE 5 MG/ML
INJECTION, SOLUTION EPIDURAL; INFILTRATION; PERINEURAL
Status: DISCONTINUED | OUTPATIENT
Start: 2018-04-12 | End: 2018-04-12 | Stop reason: HOSPADM

## 2018-04-12 RX ORDER — HYDROCODONE BITARTRATE AND ACETAMINOPHEN 10; 325 MG/1; MG/1
1 TABLET ORAL EVERY 4 HOURS PRN
Status: DISCONTINUED | OUTPATIENT
Start: 2018-04-12 | End: 2018-04-13 | Stop reason: HOSPADM

## 2018-04-12 RX ORDER — EPHEDRINE SULFATE 50 MG/ML
INJECTION, SOLUTION INTRAVENOUS
Status: DISCONTINUED | OUTPATIENT
Start: 2018-04-12 | End: 2018-04-12

## 2018-04-12 RX ORDER — IBUPROFEN 200 MG
24 TABLET ORAL
Status: DISCONTINUED | OUTPATIENT
Start: 2018-04-12 | End: 2018-04-13 | Stop reason: HOSPADM

## 2018-04-12 RX ORDER — ROCURONIUM BROMIDE 10 MG/ML
INJECTION, SOLUTION INTRAVENOUS
Status: DISCONTINUED | OUTPATIENT
Start: 2018-04-12 | End: 2018-04-12

## 2018-04-12 RX ORDER — SODIUM CHLORIDE 9 MG/ML
INJECTION, SOLUTION INTRAVENOUS ONCE
Status: COMPLETED | OUTPATIENT
Start: 2018-04-12 | End: 2018-04-13

## 2018-04-12 RX ORDER — ASPIRIN 81 MG/1
81 TABLET ORAL DAILY
Status: DISCONTINUED | OUTPATIENT
Start: 2018-04-12 | End: 2018-04-13 | Stop reason: HOSPADM

## 2018-04-12 RX ORDER — ROPINIROLE 0.25 MG/1
1 TABLET, FILM COATED ORAL NIGHTLY
Status: DISCONTINUED | OUTPATIENT
Start: 2018-04-12 | End: 2018-04-13 | Stop reason: HOSPADM

## 2018-04-12 RX ORDER — CEFAZOLIN SODIUM 2 G/50ML
2 SOLUTION INTRAVENOUS
Status: DISCONTINUED | OUTPATIENT
Start: 2018-04-12 | End: 2018-04-13 | Stop reason: HOSPADM

## 2018-04-12 RX ORDER — SODIUM CHLORIDE 0.9 % (FLUSH) 0.9 %
3 SYRINGE (ML) INJECTION
Status: DISCONTINUED | OUTPATIENT
Start: 2018-04-12 | End: 2018-04-13 | Stop reason: HOSPADM

## 2018-04-12 RX ORDER — ACETAMINOPHEN 10 MG/ML
INJECTION, SOLUTION INTRAVENOUS
Status: DISCONTINUED | OUTPATIENT
Start: 2018-04-12 | End: 2018-04-12

## 2018-04-12 RX ORDER — FENTANYL CITRATE 50 UG/ML
25 INJECTION, SOLUTION INTRAMUSCULAR; INTRAVENOUS EVERY 5 MIN PRN
Status: DISCONTINUED | OUTPATIENT
Start: 2018-04-12 | End: 2018-04-12 | Stop reason: HOSPADM

## 2018-04-12 RX ORDER — MEPERIDINE HYDROCHLORIDE 50 MG/ML
12.5 INJECTION INTRAMUSCULAR; INTRAVENOUS; SUBCUTANEOUS ONCE AS NEEDED
Status: DISCONTINUED | OUTPATIENT
Start: 2018-04-12 | End: 2018-04-12 | Stop reason: HOSPADM

## 2018-04-12 RX ORDER — SODIUM CHLORIDE, SODIUM LACTATE, POTASSIUM CHLORIDE, CALCIUM CHLORIDE 600; 310; 30; 20 MG/100ML; MG/100ML; MG/100ML; MG/100ML
INJECTION, SOLUTION INTRAVENOUS CONTINUOUS
Status: DISCONTINUED | OUTPATIENT
Start: 2018-04-12 | End: 2018-04-12

## 2018-04-12 RX ORDER — ONDANSETRON HYDROCHLORIDE 2 MG/ML
INJECTION, SOLUTION INTRAMUSCULAR; INTRAVENOUS
Status: DISCONTINUED | OUTPATIENT
Start: 2018-04-12 | End: 2018-04-12

## 2018-04-12 RX ORDER — ONDANSETRON 2 MG/ML
4 INJECTION INTRAMUSCULAR; INTRAVENOUS DAILY PRN
Status: DISCONTINUED | OUTPATIENT
Start: 2018-04-12 | End: 2018-04-12 | Stop reason: HOSPADM

## 2018-04-12 RX ORDER — VANCOMYCIN HCL IN 5 % DEXTROSE 1G/250ML
1000 PLASTIC BAG, INJECTION (ML) INTRAVENOUS
Status: DISCONTINUED | OUTPATIENT
Start: 2018-04-12 | End: 2018-04-13 | Stop reason: HOSPADM

## 2018-04-12 RX ORDER — SODIUM CHLORIDE 0.9 % (FLUSH) 0.9 %
5 SYRINGE (ML) INJECTION
Status: DISCONTINUED | OUTPATIENT
Start: 2018-04-12 | End: 2018-04-13 | Stop reason: HOSPADM

## 2018-04-12 RX ORDER — FENTANYL CITRATE 50 UG/ML
INJECTION, SOLUTION INTRAMUSCULAR; INTRAVENOUS
Status: DISCONTINUED | OUTPATIENT
Start: 2018-04-12 | End: 2018-04-12

## 2018-04-12 RX ORDER — LANOLIN ALCOHOL/MO/W.PET/CERES
400 CREAM (GRAM) TOPICAL DAILY
Status: DISCONTINUED | OUTPATIENT
Start: 2018-04-12 | End: 2018-04-13 | Stop reason: HOSPADM

## 2018-04-12 RX ORDER — BACITRACIN 50000 [IU]/1
INJECTION, POWDER, FOR SOLUTION INTRAMUSCULAR
Status: DISCONTINUED | OUTPATIENT
Start: 2018-04-12 | End: 2018-04-12 | Stop reason: HOSPADM

## 2018-04-12 RX ORDER — INSULIN ASPART 100 [IU]/ML
0-5 INJECTION, SOLUTION INTRAVENOUS; SUBCUTANEOUS
Status: DISCONTINUED | OUTPATIENT
Start: 2018-04-12 | End: 2018-04-13 | Stop reason: HOSPADM

## 2018-04-12 RX ORDER — HYDROMORPHONE HYDROCHLORIDE 2 MG/ML
0.4 INJECTION, SOLUTION INTRAMUSCULAR; INTRAVENOUS; SUBCUTANEOUS EVERY 5 MIN PRN
Status: DISCONTINUED | OUTPATIENT
Start: 2018-04-12 | End: 2018-04-12 | Stop reason: HOSPADM

## 2018-04-12 RX ORDER — CLOPIDOGREL BISULFATE 75 MG/1
75 TABLET ORAL DAILY
Status: DISCONTINUED | OUTPATIENT
Start: 2018-04-12 | End: 2018-04-13 | Stop reason: HOSPADM

## 2018-04-12 RX ORDER — HYDROCODONE BITARTRATE AND ACETAMINOPHEN 5; 325 MG/1; MG/1
1 TABLET ORAL EVERY 4 HOURS PRN
Status: DISCONTINUED | OUTPATIENT
Start: 2018-04-12 | End: 2018-04-13 | Stop reason: HOSPADM

## 2018-04-12 RX ORDER — PROPOFOL 10 MG/ML
VIAL (ML) INTRAVENOUS
Status: DISCONTINUED | OUTPATIENT
Start: 2018-04-12 | End: 2018-04-12

## 2018-04-12 RX ORDER — OXYCODONE HYDROCHLORIDE 5 MG/1
5 TABLET ORAL
Status: DISCONTINUED | OUTPATIENT
Start: 2018-04-12 | End: 2018-04-12 | Stop reason: HOSPADM

## 2018-04-12 RX ORDER — FENOFIBRATE 160 MG/1
160 TABLET ORAL DAILY
Status: DISCONTINUED | OUTPATIENT
Start: 2018-04-12 | End: 2018-04-13 | Stop reason: HOSPADM

## 2018-04-12 RX ORDER — GLYCOPYRROLATE 0.2 MG/ML
INJECTION INTRAMUSCULAR; INTRAVENOUS
Status: DISCONTINUED | OUTPATIENT
Start: 2018-04-12 | End: 2018-04-12

## 2018-04-12 RX ORDER — GABAPENTIN 300 MG/1
300 CAPSULE ORAL 2 TIMES DAILY
Status: DISCONTINUED | OUTPATIENT
Start: 2018-04-12 | End: 2018-04-13 | Stop reason: HOSPADM

## 2018-04-12 RX ORDER — ATORVASTATIN CALCIUM 20 MG/1
20 TABLET, FILM COATED ORAL DAILY
Status: DISCONTINUED | OUTPATIENT
Start: 2018-04-12 | End: 2018-04-13 | Stop reason: HOSPADM

## 2018-04-12 RX ORDER — IBUPROFEN 200 MG
16 TABLET ORAL
Status: DISCONTINUED | OUTPATIENT
Start: 2018-04-12 | End: 2018-04-13 | Stop reason: HOSPADM

## 2018-04-12 RX ORDER — GLUCAGON 1 MG
1 KIT INJECTION
Status: DISCONTINUED | OUTPATIENT
Start: 2018-04-12 | End: 2018-04-13 | Stop reason: HOSPADM

## 2018-04-12 RX ORDER — METOPROLOL SUCCINATE 25 MG/1
25 TABLET, EXTENDED RELEASE ORAL DAILY
Status: DISCONTINUED | OUTPATIENT
Start: 2018-04-12 | End: 2018-04-13 | Stop reason: HOSPADM

## 2018-04-12 RX ORDER — LIDOCAINE HYDROCHLORIDE 10 MG/ML
0.5 INJECTION, SOLUTION EPIDURAL; INFILTRATION; INTRACAUDAL; PERINEURAL ONCE
Status: DISCONTINUED | OUTPATIENT
Start: 2018-04-12 | End: 2018-04-12 | Stop reason: HOSPADM

## 2018-04-12 RX ORDER — ONDANSETRON 8 MG/1
8 TABLET, ORALLY DISINTEGRATING ORAL EVERY 8 HOURS PRN
Status: DISCONTINUED | OUTPATIENT
Start: 2018-04-12 | End: 2018-04-13 | Stop reason: HOSPADM

## 2018-04-12 RX ORDER — POLYETHYLENE GLYCOL 3350 17 G/17G
17 POWDER, FOR SOLUTION ORAL 2 TIMES DAILY PRN
Status: DISCONTINUED | OUTPATIENT
Start: 2018-04-12 | End: 2018-04-13 | Stop reason: HOSPADM

## 2018-04-12 RX ORDER — LIDOCAINE HCL/PF 100 MG/5ML
SYRINGE (ML) INTRAVENOUS
Status: DISCONTINUED | OUTPATIENT
Start: 2018-04-12 | End: 2018-04-12

## 2018-04-12 RX ORDER — SODIUM CHLORIDE 9 MG/ML
INJECTION, SOLUTION INTRAVENOUS CONTINUOUS
Status: DISCONTINUED | OUTPATIENT
Start: 2018-04-12 | End: 2018-04-12

## 2018-04-12 RX ADMIN — CEFAZOLIN 1 G: 330 INJECTION, POWDER, FOR SOLUTION INTRAMUSCULAR; INTRAVENOUS at 08:04

## 2018-04-12 RX ADMIN — EPHEDRINE SULFATE 5 MG: 50 INJECTION INTRAMUSCULAR; INTRAVENOUS; SUBCUTANEOUS at 09:04

## 2018-04-12 RX ADMIN — CEFAZOLIN 1 G: 330 INJECTION, POWDER, FOR SOLUTION INTRAMUSCULAR; INTRAVENOUS at 09:04

## 2018-04-12 RX ADMIN — ASPIRIN 81 MG: 81 TABLET, COATED ORAL at 09:04

## 2018-04-12 RX ADMIN — ACETAMINOPHEN 1000 MG: 10 INJECTION, SOLUTION INTRAVENOUS at 09:04

## 2018-04-12 RX ADMIN — ATORVASTATIN CALCIUM 20 MG: 20 TABLET, FILM COATED ORAL at 09:04

## 2018-04-12 RX ADMIN — INSULIN ASPART 3 UNITS: 100 INJECTION, SOLUTION INTRAVENOUS; SUBCUTANEOUS at 05:04

## 2018-04-12 RX ADMIN — ONDANSETRON 4 MG: 2 INJECTION, SOLUTION INTRAMUSCULAR; INTRAVENOUS at 09:04

## 2018-04-12 RX ADMIN — SODIUM CHLORIDE, SODIUM LACTATE, POTASSIUM CHLORIDE, AND CALCIUM CHLORIDE: 600; 310; 30; 20 INJECTION, SOLUTION INTRAVENOUS at 08:04

## 2018-04-12 RX ADMIN — LIDOCAINE HYDROCHLORIDE 75 MG: 20 INJECTION, SOLUTION INTRAVENOUS at 08:04

## 2018-04-12 RX ADMIN — HYDROMORPHONE HYDROCHLORIDE 0.4 MG: 2 INJECTION, SOLUTION INTRAMUSCULAR; INTRAVENOUS; SUBCUTANEOUS at 10:04

## 2018-04-12 RX ADMIN — FENTANYL CITRATE 100 MCG: 50 INJECTION, SOLUTION INTRAMUSCULAR; INTRAVENOUS at 08:04

## 2018-04-12 RX ADMIN — CARBOXYMETHYLCELLULOSE SODIUM 2 DROP: 2.5 SOLUTION/ DROPS OPHTHALMIC at 08:04

## 2018-04-12 RX ADMIN — POLYETHYLENE GLYCOL 3350 17 G: 17 POWDER, FOR SOLUTION ORAL at 07:04

## 2018-04-12 RX ADMIN — ROCURONIUM BROMIDE 30 MG: 10 INJECTION, SOLUTION INTRAVENOUS at 08:04

## 2018-04-12 RX ADMIN — INSULIN DETEMIR 15 UNITS: 100 INJECTION, SOLUTION SUBCUTANEOUS at 09:04

## 2018-04-12 RX ADMIN — GLYCOPYRROLATE 0.4 MG: 0.2 INJECTION, SOLUTION INTRAMUSCULAR; INTRAVENOUS at 09:04

## 2018-04-12 RX ADMIN — CLOPIDOGREL 75 MG: 75 TABLET, FILM COATED ORAL at 09:04

## 2018-04-12 RX ADMIN — INSULIN ASPART 2 UNITS: 100 INJECTION, SOLUTION INTRAVENOUS; SUBCUTANEOUS at 09:04

## 2018-04-12 RX ADMIN — CEFAZOLIN SODIUM 2 G: 2 SOLUTION INTRAVENOUS at 09:04

## 2018-04-12 RX ADMIN — ROPINIROLE HYDROCHLORIDE 1 MG: 0.25 TABLET, FILM COATED ORAL at 09:04

## 2018-04-12 RX ADMIN — PROPOFOL 160 MG: 10 INJECTION, EMULSION INTRAVENOUS at 08:04

## 2018-04-12 RX ADMIN — GLYCOPYRROLATE 0.2 MG: 0.2 INJECTION, SOLUTION INTRAMUSCULAR; INTRAVENOUS at 08:04

## 2018-04-12 RX ADMIN — SODIUM CHLORIDE: 0.9 INJECTION, SOLUTION INTRAVENOUS at 05:04

## 2018-04-12 RX ADMIN — NEOSTIGMINE METHYLSULFATE 2.5 MG: 1 INJECTION INTRAVENOUS at 09:04

## 2018-04-12 RX ADMIN — CEFAZOLIN SODIUM 2 G: 2 SOLUTION INTRAVENOUS at 03:04

## 2018-04-12 RX ADMIN — VANCOMYCIN HYDROCHLORIDE 1000 MG: 1 INJECTION, POWDER, LYOPHILIZED, FOR SOLUTION INTRAVENOUS at 12:04

## 2018-04-12 RX ADMIN — EPHEDRINE SULFATE 10 MG: 50 INJECTION INTRAMUSCULAR; INTRAVENOUS; SUBCUTANEOUS at 09:04

## 2018-04-12 RX ADMIN — GABAPENTIN 300 MG: 300 CAPSULE ORAL at 09:04

## 2018-04-12 NOTE — PT/OT/SLP EVAL
"Physical Therapy Evaluation    Patient Name:  Luca Camarena III   MRN:  [id number]    Recommendations:     Discharge Recommendations:  Home with IV Ab (OP when approved by MD)   Discharge Equipment Recommendations: none   Barriers to discharge: Inaccessible home    Assessment:     Luca Camarena III is a 63 y.o. male admitted with a medical diagnosis of Patella fracture.  He presents with the following impairments/functional limitations:  weakness, impaired endurance, orthopedic precautions, pain, impaired functional mobilty, gait instability, impaired balance, decreased safety awareness, impaired sensation .  Pt requiring cueing for upright posture    Rehab Prognosis:  good; patient would benefit from acute skilled PT services to address these deficits and reach maximum level of function.      Recent Surgery: Procedure(s) (LRB):  OPEN REDUCTION INTERNAL FIXATION-PATELLA REVISION WITH EXTENSOR MECHANISM RECONSTRUCTION (Right) Day of Surgery    Plan:     During this hospitalization, patient to be seen daily to address the above listed problems via gait training, therapeutic activities  · Plan of Care Expires:  05/12/18   Plan of Care Reviewed with: patient, spouse    Subjective     Communicated with nursing prior to session.  Patient found sitting up in bed  upon PT entry to room, agreeable to evaluation.      Chief Complaint: knee pain  Patient comments/goals: I would like to get a quad cane. (pt instructed not appropriate at this time)  Pain/Comfort:  · Pain Rating 1: 6/10  · Location - Side 1: Right  · Location - Orientation 1: generalized  · Location 1: knee  · Pain Addressed 1:  (refusing pain meds)  · Pain Rating Post-Intervention 1: 5/10    Patients cultural, spiritual, Jainism conflicts given the current situation: none    Living Environment:  Pt lives with wife and son in 1 story house with 1 high step to enter-pt states is 12" but when shows PT with hands looks~ 8".  Tub shower combo with TTB, handicap " "toilet .   Has BSC -but "I threw it out in yard".  Pt has large garden and numerous animals.    Prior to admission, patients level of function was mod I with RW, mod I for bathing with DME, dressing but needs assist with sock on R-wearing crocs to slip on.  Patient has the following equipment: walker, rolling, bath bench.  DME owned (not currently used): single point cane and bedside commode.  Upon discharge, patient will have assistance from wife.    Objective:     Patient found with: peripheral IV     General Precautions: Standard, fall , LEGALLY BLIND  Orthopedic Precautions:LLE weight bearing as tolerated (NO EXERCISES PER MD)   Braces: Knee immobilizer (on at all times)     Exams:  · Cognitive Exam:  Patient is oriented to Person, Place, Time and Situation and follows multi step commands commands   · Gross Motor Coordination:  WFL  · Postural Exam:  Patient presented with the following abnormalities:    · -       Rounded shoulders  · -       Forward head  · -       Posterior pelvic tilt  · -       Flexed forward at waist in standing   · Sensation: diabetic neuropathy in B feet  · Skin Integrity/Edema:      · -       Skin integrity: R knee in surgical bandage with KI on, pt with prior amp of R 1st toe IP and L 5th ray resection   · -       Edema: R LE  · RLE ROM: Deficits: unable to assess 2* KI, ankle decreased 2* swelling   · RLE Strength: Deficits: not assessed-no ex per MD  · LLE ROM: WFL  · LLE Strength: WFL    Functional Mobility:  · Bed Mobility:     · Supine to Sit: stand by assistance  · Transfers:     · Sit to Stand:  contact guard assistance with rolling walker  · Gait: 120' with CGA and RW.  pt required verbal and tactile cues for upright posture-pt with stooped posture, flexed at waist   · Balance: fair standing    AM-PAC 6 CLICK MOBILITY  Total Score:17       Therapeutic Activities and Exercises:   PT eval with gt as above.  Reviewed safety and upright posture.  Pt requesting QC-not appropriate at " "this time.      Patient left up in chair with all lines intact, call button in reach, nurse notified and wife present.    GOALS:    Physical Therapy Goals        Problem: Physical Therapy Goal    Goal Priority Disciplines Outcome Goal Variances Interventions   Physical Therapy Goal     PT/OT, PT Ongoing (interventions implemented as appropriate)     Description:  Goals to be met by: 18     Patient will increase functional independence with mobility by performin. Supine to sit with supervision.   2. Sit to supine with supervision.   3. Sit<>stand transfer with supervision using rolling walker.   4. Gait > 150 feet with SBA using rolling walker.   5. Ascend/descend 8" curb step  with CGA with RW                      History:     Past Medical History:   Diagnosis Date    Anticoagulant long-term use     Coronary artery disease 2004    stents x 3    CRI (chronic renal insufficiency)     Diabetes mellitus     Diaphragm paralysis     Elbow wound 2017    s/p fall; 3 stitches    Fall 2017    Fever     Glaucoma     Hearing deficit     Hypertension     Legally blind     Restless leg        Past Surgical History:   Procedure Laterality Date    CORONARY STENT PLACEMENT  2004    EYE SURGERY Bilateral     cataract and vitrectomy    foot surgery Bilateral     toe amputations    GASTRIC BYPASS  2011    JOINT REPLACEMENT      KNEE SURGERY Right     x 2    ROTATOR CUFF REPAIR Bilateral     TONSILLECTOMY         Clinical Decision Making:     History  Co-morbidities and personal factors that may impact the plan of care Examination  Body Structures and Functions, activity limitations and participation restrictions that may impact the plan of care Clinical Presentation   Decision Making/ Complexity Score   Co-morbidities:   [] Time since onset of injury / illness / exacerbation  [] Status of current condition  []Patient's cognitive status and safety concerns    [] Multiple Medical Problems (see " med hx)  Personal Factors:   [] Patient's age  [] Prior Level of function   [] Patient's home situation (environment and family support)  [] Patient's level of motivation  [] Expected progression of patient      HISTORY:(criteria)    [] 87567 - no personal factors/history    [] 63689 - has 1-2 personal factor/comorbidity     [] 15412 - has >3 personal factor/comorbidity     Body Regions:  [] Objective examination findings  [] Head     []  Neck  [] Trunk   [] Upper Extremity  [] Lower Extremity    Body Systems:  [] For communication ability, affect, cognition, language, and learning style: the assessment of the ability to make needs known, consciousness, orientation (person, place, and time), expected emotional /behavioral responses, and learning preferences (eg, learning barriers, education  needs)  [] For the neuromuscular system: a general assessment of gross coordinated movement (eg, balance, gait, locomotion, transfers, and transitions) and motor function  (motor control and motor learning)  [] For the musculoskeletal system: the assessment of gross symmetry, gross range of motion, gross strength, height, and weight  [] For the integumentary system: the assessment of pliability(texture), presence of scar formation, skin color, and skin integrity  [] For cardiovascular/pulmonary system: the assessment of heart rate, respiratory rate, blood pressure, and edema     Activity limitations:    [] Patient's cognitive status and saf ety concerns          [] Status of current condition      [] Weight bearing restriction  [] Cardiopulmunary Restriction    Participation Restrictions:   [] Goals and goal agreement with the patient     [] Rehab potential (prognosis) and probable outcome      Examination of Body System: (criteria)    [] 08912 - addressing 1-2 elements    [] 54501 - addressing a total of 3 or more elements     [] 23553 -  Addressing a total of 4 or more elements         Clinical Presentation: (criteria)  Choose  one     On examination of body system using standardized tests and measures patient presents with (CHOOSE ONE) elements from any of the following: body structures and functions, activity limitations, and/or participation restrictions.  Leading to a clinical presentation that is considered (CHOOSE ONE)                              Clinical Decision Making  (Eval Complexity):  Choose One     Time Tracking:     PT Received On: 04/12/18  PT Start Time: 1255     PT Stop Time: 1340  PT Total Time (min): 45 min     Billable Minutes: Evaluation 15, Gait Training 16 and Therapeutic Activity 14      Henny Penn, PT  04/12/2018

## 2018-04-12 NOTE — PROGRESS NOTES
Pt requested quad cane; SW consulted Henny, PT and Henny not recommending quad cane, at this time, not safe; pt needs to use walker and may be able to use cane later, with Dr. Augustin's okay.  Henny also she informed, not recommending cane.

## 2018-04-12 NOTE — TRANSFER OF CARE
"Anesthesia Transfer of Care Note    Patient: Luca Allisonler III    Procedure(s) Performed: Procedure(s) (LRB):  OPEN REDUCTION INTERNAL FIXATION-PATELLA REVISION WITH EXTENSOR MECHANISM RECONSTRUCTION (Right)    Patient location: PACU    Anesthesia Type: general    Transport from OR: Transported from OR on room air with adequate spontaneous ventilation    Post pain: adequate analgesia    Post assessment: no apparent anesthetic complications    Post vital signs: stable    Level of consciousness: responds to stimulation    Nausea/Vomiting: no nausea/vomiting    Complications: none    Transfer of care protocol was followed      Last vitals:   Visit Vitals  /61 (BP Location: Right arm, Patient Position: Lying)   Pulse 85   Temp 36.4 °C (97.5 °F) (Oral)   Resp 16   Ht 5' 11" (1.803 m)   Wt 90.3 kg (199 lb)   SpO2 100%   BMI 27.75 kg/m²     "

## 2018-04-12 NOTE — PLAN OF CARE
SW met with pt at bedside to complete discharge assessment, verified PCP and uses [facility] on [address], wife, [place] present.  Pt lives with spouse and 27 yo son.  Pt has RW, BSC, and TTB.  Pt may need IV antibiotic and HH upon discharge. Pt selected [facility], waiting for MD's dc plan.     04/12/18 1214   Discharge Assessment   Assessment Type Discharge Planning Assessment   Assessment information obtained from? Patient;Caregiver   Communicated expected length of stay with patient/caregiver no   Prior to hospitilization cognitive status: Alert/Oriented   Prior to hospitalization functional status: Independent   Current cognitive status: Alert/Oriented   Current Functional Status: Assistive Equipment;Needs Assistance   Lives With spouse   Able to Return to Prior Arrangements yes   Is patient able to care for self after discharge? Yes   Patient's perception of discharge disposition home or selfcare   Readmission Within The Last 30 Days no previous admission in last 30 days   Patient currently being followed by outpatient case management? No   Patient currently receives any other outside agency services? No   Equipment Currently Used at Home bedside commode;walker, rolling  (transfer tub bench)   Do you have any problems affording any of your prescribed medications? No   Is the patient taking medications as prescribed? yes   Does the patient have transportation home? Yes   Transportation Available family or friend will provide   Discharge Plan A Home Health   Patient/Family In Agreement With Plan yes

## 2018-04-12 NOTE — OP NOTE
DATE OF PROCEDURE:  04/12/2018    CHIEF COMPLAINT AND PRESENT ILLNESS:  The patient is a 63-year-old status post   patellar fracture back in December.  He is severe diabetic, poor skin with a   significant patellar fracture.  I just did a suture ORIF, did not put any metal   because of his poor tissue.  He did okay for two to three months and then he   stumbled and pulled that apart and a significant hematoma then, I will let his   skin kind of calm down with a hematoma still having persistent swelling and also   having a low-grade fever.  The skin and tissue started looking good, ecchymosis   had resolved, so I decided to do a repeat ORIF to get his extensor mechanism   together, still significant risk with any kind of hardware was discussed at   length and I was either going to go to do a partial patellectomy or try to put   it back together with a suture.    PREOPERATIVE DIAGNOSIS:  Failed patellar ORIF.    POSTOPERATIVE DIAGNOSIS:  Failed patellar ORIF along with infection.    PROCEDURE:  Right knee synovectomy, irrigation and debridement with repair of   patellar extensor mechanism with suture.    SURGEON:  Armando Augustin M.D.    ASSISTANT:  Mirna Garrido CST.    COMPLICATIONS:  None.    ANESTHESIA:  General anesthesia.    TOURNIQUET:  None.    BLOOD LOSS:  About 50 mL    PROCEDURE IN DETAIL:  The patient was brought to the Operating Room and   underwent general intubation without difficulty.  Right lower extremity was   prepped and draped in the usual sterile fashion.  Tourniquet was applied, but   not inflated up.  Using the prior midline incision, sharp dissection made down   to the extensor mechanism.  On getting down to there, I encountered some little   scar tissue where the big diastasis of the patella was and on opening that, he   had a lot of fluid and he also had some little caseating granulomatous type   stringing almost looked like egg drop soup type thing, but the fluid was clear,   but he had  that white there, which really worried me about infection, so I did a   thorough synovectomy, thorough irrigation with bacitracin really cleaned   everything up, cleaned up the ends of the bone, removed some of the old suture   material.  The proximal and distal pole patella had really coalesced well   because there was a comminuted fracture before, but really was solid and it was   pretty good size and I did not want to take that bone out because it appeared to   be in good shape, did not show any evidence of abscess or infection and if I   took that bone out, I was going to get a hard time even reconstructing the   extensor mechanism because of the gap, so with the FiberTape through both poles   of the patella and then I put one circumferentially and then I put a few in   either gutter and across the top and tied them sequentially and really get a   good apposition of the bone.  Again, the ends of patella were raw and should   heal in with holding them in extension for a long time.  Then I did a 2-0 Vicryl   subcutaneously and then Steri-Strips on the skin.  When I was fraying up the   gutters, bluntly with my thumb on the lateral side, I actually poked through his   skin with my thumb. It showed how thin and fragile skin his is and I had to put   a couple of stitches there sorry to say, but I was trying to do everything   least traumatic.  I did not want to put metal.  I did not put any self-retaining   retactors trying to be careful, but again even with my thumb releases of scar   tissue in the lateral flap there to get it off the extensor mechanism some came   through.  Little ropivacaine was placed in the soft tissues.  He was placed in a   bulky soft bandage with a knee immobilizer and brought to the Recovery Room in   stable fashion.      ADRIENNE/IN  dd: 04/12/2018 10:29:30 (CDT)  td: 04/12/2018 14:35:17 (CDT)  Doc ID   #[id number]  Job ID #[id number]    CC:

## 2018-04-12 NOTE — PLAN OF CARE
"Problem: Physical Therapy Goal  Goal: Physical Therapy Goal  Goals to be met by: 18     Patient will increase functional independence with mobility by performin. Supine to sit with supervision.   2. Sit to supine with supervision.   3. Sit<>stand transfer with supervision using rolling walker.   4. Gait > 150 feet with SBA using rolling walker.   5. Ascend/descend 8" curb step  with CGA with RW    Outcome: Ongoing (interventions implemented as appropriate)  PT eval.  Pt with KI on R leg.  Ortho nurse spoke to Dr. Augustin-SHOSHANA EXERCISES, nust be in KI at all times.  Able to get up and amb-requiring verbal and tactile cues for upright posture.  Pt also is legally blind  REC:  Home with IV Ab, will need OP therapy when approved by Dr. Augustin  No DME needs.  Pt asking about QC.  Not appropriate at this time      "

## 2018-04-12 NOTE — NURSING
Pt arrived to floor via stretcher with VERO Scruggs and transferred self to bed. VS taken and stable on RA and afebrile. Oriented to room, call light placed within reach, bed low and locked, and family at bedside. No complaints of pain. Dressing/Immobilizer noted to STEVE Albarran. No acute distress noted at this time. Will continue to monitor.       1811  Denies pain. Tolerating ordered diet. BG monitored. Immobilizer maintained. Up in chair at this shift.

## 2018-04-12 NOTE — CONSULTS
Consult Note  Nephrology    Consult Requested By: Armando Augustin MD  Reason for Consult: patella fracture,T2DM, protein/calorie malnutrition, leukocytosis, anemia, hyponatremia, RLS, HTN, diaphragm paralysis, CKD3, CAD    SUBJECTIVE:     History of Present Illness:   63 y.o. male presents  with scheduled patella repair after fall at home. Patient seen in room, Epic and paper chart reviewed prior to eval.   Denies CP,SOB,N/V, does report fever and chills. Patient has been treated for a hematoma in his right thigh and followed by ID at [facility].  Patient has no eemrson and is due to void.       Past Medical History:   Diagnosis Date    Anticoagulant long-term use     Coronary artery disease 2004    stents x 3    CRI (chronic renal insufficiency)     Diabetes mellitus     Diaphragm paralysis     Elbow wound 12/01/2017    s/p fall; 3 stitches    Fall 12/01/2017    Fever     Glaucoma     Hearing deficit     Hypertension     Hyponatremia     Legally blind     Restless leg      Past Surgical History:   Procedure Laterality Date    CORONARY STENT PLACEMENT  2004    EYE SURGERY Bilateral     cataract and vitrectomy    foot surgery Bilateral     toe amputations    GASTRIC BYPASS  2011    JOINT REPLACEMENT      KNEE SURGERY Right     x 2    ROTATOR CUFF REPAIR Bilateral     TONSILLECTOMY       History reviewed. No pertinent family history.  Social History   Substance Use Topics    Smoking status: Never Smoker    Smokeless tobacco: Never Used    Alcohol use Yes      Comment: rare       Review of patient's allergies indicates:  No Known Allergies     Review of Systems:  Constitutional: + fever, + chills  Respiratory: No cough or shortness of breath  Cardiovascular: No chest pain or palpitations  Gastrointestinal: No nausea or vomiting  Neurological: No confusion or weakness    OBJECTIVE:     Vital Signs (Most Recent)  Temp: 97.7 °F (36.5 °C) (04/12/18 1145)  Pulse: 87 (04/12/18 1145)  Resp: 18 (04/12/18  1145)  BP: 138/66 (04/12/18 1145)  SpO2: (!) 94 % (04/12/18 1145)    Vital Signs Range (Last 24H):  Temp:  [97.5 °F (36.4 °C)-97.7 °F (36.5 °C)]   Pulse:  [83-99]   Resp:  [16-18]   BP: (115-142)/(61-73)   SpO2:  [94 %-100 %]       Intake/Output Summary (Last 24 hours) at 04/12/18 1536  Last data filed at 04/12/18 1110   Gross per 24 hour   Intake             1500 ml   Output                0 ml   Net             1500 ml       Physical Exam:  General appearance: Well developed, well nourished  Eyes:  Conjunctivae/corneas clear. PERRL.  Lungs: Normal respiratory effort,   clear to auscultation bilaterally   Heart: Regular rate and rhythm, S1, S2 normal, no murmur, rub or fatoumata.  Abdomen: Soft, non-tender non-distended; bowel sounds normal; no masses,  no organomegaly  Extremities: No cyanosis or clubbing. No edema.  ACE wrap and knee brace to right knee. Toe amputations noted, pulses BLE +1  Skin: Skin color, texture, turgor normal. No rashes or lesions  Neurologic: Normal strength and tone. No focal numbness or weakness       Laboratory:  Reviewed    Diagnostic Results:      ASSESSMENT/PLAN:     1. Patella repair with I & D(S82.031A): per ortho and therapy teams.  Patient followed by ID at [facility].  Will use vancomycin for abx with close monitoring.  2. Leukocytosis (D72.829): noted on pre-op labs, Will await results of tomorrow's  Labs  3. Anemia (D64.9): noted on pre-op labs, followed by Dr. Neal.  Will await results of tomorrow's labs. Asymptomatic at this time.  4. Hyponatremia (E87.1): noted on pre-op labs (132).  Await tomorrow's labs.  Will give NS @ 75 cc/hr.  5. Hyperkalemia (E78.5): noted on pre-op labs.  Will monitor.  6. Hypertension (I12.9): meds with hold parameters  7. CKD 3 (N18.3): Renally dose meds, avoid nephrotoxins, and monitor I/O's closely.  8. T2DM (E11.9): long acting dose decreased to 15 units. Takes 22 at home. Mealtime SSI, patient agreeable to change in insulin dosing to avoid risk of  hypoglycemia.    9. Protein/Calorie malnutrition (E46): would encourage protein rich foods  10. Diaphragm paralysis (J98.6): caution with narcotics  11. CAD (I25.10): continue statin and fenofibrate  12. RLS (G25.81): continue home med  13. DVT prophy: ASA 81 mg BID     Plan:See above recommendations and orders. Will follow along.

## 2018-04-12 NOTE — ANESTHESIA POSTPROCEDURE EVALUATION
"Anesthesia Post Evaluation    Patient: Luca Allisonler III    Procedure(s) Performed: Procedure(s) (LRB):  OPEN REDUCTION INTERNAL FIXATION-PATELLA REVISION WITH EXTENSOR MECHANISM RECONSTRUCTION (Right)    Final Anesthesia Type: general  Patient location during evaluation: PACU  Patient participation: Yes- Able to Participate  Level of consciousness: awake and alert  Post-procedure vital signs: reviewed and stable  Pain management: adequate  Airway patency: patent  PONV status at discharge: No PONV  Anesthetic complications: no      Cardiovascular status: hemodynamically stable  Respiratory status: unassisted  Hydration status: euvolemic  Follow-up not needed.        Visit Vitals  /66 (Patient Position: Lying)   Pulse 87   Temp 36.5 °C (97.7 °F) (Oral)   Resp 18   Ht 5' 11" (1.803 m)   Wt 90.3 kg (199 lb)   SpO2 (!) 94%   BMI 27.75 kg/m²       Pain/Oscar Score: Pain Assessment Performed: Yes (4/12/2018 11:10 AM)  Presence of Pain: complains of pain/discomfort (4/12/2018 11:10 AM)  Pain Rating Prior to Med Admin: 7 (4/12/2018 10:30 AM)  Pain Rating Post Med Admin: 3 (4/12/2018 11:10 AM)  Oscar Score: 10 (4/12/2018 11:10 AM)      "

## 2018-04-12 NOTE — PLAN OF CARE
Problem: Patient Care Overview  Goal: Plan of Care Review  Outcome: Ongoing (interventions implemented as appropriate)  Pt on RA. Sats 98%. No distress noted. Will continue to monitor.

## 2018-04-12 NOTE — BRIEF OP NOTE
[facility]  Brief Operative Note    SUMMARY     Surgery Date: 4/12/2018     Surgeon(s) and Role:     * Armando Augustin MD - Primary    Assisting Surgeon: None    Pre-op Diagnosis:  Closed displaced transverse fracture of right patella, initial encounter [S82.031A]    Post-op Diagnosis:  Post-Op Diagnosis Codes:     * Closed displaced transverse fracture of right patella, initial encounter [S82.031A]    Procedure(s) (LRB):  OPEN REDUCTION INTERNAL FIXATION-PATELLA REVISION WITH EXTENSOR MECHANISM RECONSTRUCTION (Right)    Anesthesia: General    Description of Procedure: orif pat  I and d    Description of the findings of the procedure: infx failed pat    Estimated Blood Loss: * No values recorded between 4/12/2018  9:05 AM and 4/12/2018 10:06 AM *50         Specimens:   Specimen (12h ago through future)    None

## 2018-04-12 NOTE — INTERVAL H&P NOTE
The patient has been examined and the H&P has been reviewed:    I concur with the findings and no changes have occurred since H&P was written.    Anesthesia/Surgery risks, benefits and alternative options discussed and understood by patient/family.          Active Hospital Problems    Diagnosis  POA    *Patella fracture [S82.009A]  Yes      Resolved Hospital Problems    Diagnosis Date Resolved POA   No resolved problems to display.

## 2018-04-13 VITALS
RESPIRATION RATE: 18 BRPM | DIASTOLIC BLOOD PRESSURE: 61 MMHG | WEIGHT: 199 LBS | BODY MASS INDEX: 27.86 KG/M2 | TEMPERATURE: 99 F | OXYGEN SATURATION: 97 % | HEIGHT: 71 IN | SYSTOLIC BLOOD PRESSURE: 126 MMHG | HEART RATE: 95 BPM

## 2018-04-13 PROBLEM — T14.8XXA POST-TRAUMATIC WOUND INFECTION: Status: RESOLVED | Noted: 2018-04-12 | Resolved: 2018-04-13

## 2018-04-13 PROBLEM — L08.9 POST-TRAUMATIC WOUND INFECTION: Status: RESOLVED | Noted: 2018-04-12 | Resolved: 2018-04-13

## 2018-04-13 LAB
ANION GAP SERPL CALC-SCNC: 7 MMOL/L
BASOPHILS # BLD AUTO: 0.03 K/UL
BASOPHILS NFR BLD: 0.3 %
BUN SERPL-MCNC: 17 MG/DL
CALCIUM SERPL-MCNC: 8.8 MG/DL
CHLORIDE SERPL-SCNC: 103 MMOL/L
CO2 SERPL-SCNC: 25 MMOL/L
CREAT SERPL-MCNC: 1.1 MG/DL
DIFFERENTIAL METHOD: ABNORMAL
EOSINOPHIL # BLD AUTO: 0.2 K/UL
EOSINOPHIL NFR BLD: 2 %
ERYTHROCYTE [DISTWIDTH] IN BLOOD BY AUTOMATED COUNT: 16 %
EST. GFR  (AFRICAN AMERICAN): >60 ML/MIN/1.73 M^2
EST. GFR  (NON AFRICAN AMERICAN): >60 ML/MIN/1.73 M^2
GLUCOSE SERPL-MCNC: 166 MG/DL
HCT VFR BLD AUTO: 25.3 %
HGB BLD-MCNC: 8 G/DL
LYMPHOCYTES # BLD AUTO: 1.9 K/UL
LYMPHOCYTES NFR BLD: 16.8 %
MCH RBC QN AUTO: 24.4 PG
MCHC RBC AUTO-ENTMCNC: 31.6 G/DL
MCV RBC AUTO: 77 FL
MONOCYTES # BLD AUTO: 1.4 K/UL
MONOCYTES NFR BLD: 12.6 %
NEUTROPHILS # BLD AUTO: 7.6 K/UL
NEUTROPHILS NFR BLD: 67.7 %
PLATELET # BLD AUTO: 526 K/UL
PMV BLD AUTO: 8.1 FL
POCT GLUCOSE: 122 MG/DL (ref 70–110)
POTASSIUM SERPL-SCNC: 4.4 MMOL/L
RBC # BLD AUTO: 3.28 M/UL
SODIUM SERPL-SCNC: 135 MMOL/L
VANCOMYCIN SERPL-MCNC: 6.8 UG/ML
WBC # BLD AUTO: 11.23 K/UL

## 2018-04-13 PROCEDURE — 85025 COMPLETE CBC W/AUTO DIFF WBC: CPT

## 2018-04-13 PROCEDURE — 80048 BASIC METABOLIC PNL TOTAL CA: CPT

## 2018-04-13 PROCEDURE — 63600175 PHARM REV CODE 636 W HCPCS: Performed by: ORTHOPAEDIC SURGERY

## 2018-04-13 PROCEDURE — 94761 N-INVAS EAR/PLS OXIMETRY MLT: CPT

## 2018-04-13 PROCEDURE — 25000003 PHARM REV CODE 250: Performed by: ORTHOPAEDIC SURGERY

## 2018-04-13 PROCEDURE — 25000003 PHARM REV CODE 250: Performed by: NURSE PRACTITIONER

## 2018-04-13 PROCEDURE — 63700000 PHARM REV CODE 250 ALT 637 W/O HCPCS: Performed by: ORTHOPAEDIC SURGERY

## 2018-04-13 PROCEDURE — 80202 ASSAY OF VANCOMYCIN: CPT

## 2018-04-13 RX ORDER — DOXYCYCLINE HYCLATE 100 MG
100 TABLET ORAL EVERY 12 HOURS
Status: DISCONTINUED | OUTPATIENT
Start: 2018-04-13 | End: 2018-04-13 | Stop reason: HOSPADM

## 2018-04-13 RX ORDER — CIPROFLOXACIN 750 MG/1
750 TABLET, FILM COATED ORAL EVERY 12 HOURS
Qty: 30 TABLET | Refills: 0 | Status: ON HOLD | OUTPATIENT
Start: 2018-04-13 | End: 2020-11-23

## 2018-04-13 RX ORDER — DOXYCYCLINE HYCLATE 100 MG
100 TABLET ORAL EVERY 12 HOURS
Qty: 30 TABLET | Refills: 0 | Status: SHIPPED | OUTPATIENT
Start: 2018-04-13

## 2018-04-13 RX ADMIN — CEFAZOLIN SODIUM 2 G: 2 SOLUTION INTRAVENOUS at 04:04

## 2018-04-13 RX ADMIN — METOPROLOL SUCCINATE 25 MG: 25 TABLET, EXTENDED RELEASE ORAL at 08:04

## 2018-04-13 RX ADMIN — FENOFIBRATE 160 MG: 160 TABLET ORAL at 08:04

## 2018-04-13 RX ADMIN — CEFAZOLIN SODIUM 2 G: 2 SOLUTION INTRAVENOUS at 08:04

## 2018-04-13 RX ADMIN — Medication 400 MG: at 08:04

## 2018-04-13 RX ADMIN — GABAPENTIN 300 MG: 300 CAPSULE ORAL at 08:04

## 2018-04-13 NOTE — PROGRESS NOTES
Patient not seen secondary to patient being discharged. Patient did not want therapy prior to discharge. RN was notified. Priti Knott 4/13/2018

## 2018-04-13 NOTE — PLAN OF CARE
04/13/18 0937   Final Note   Assessment Type Final Discharge Note   Discharge Disposition Home   What phone number can be called within the next 1-3 days to see how you are doing after discharge? [phone]   Discharge plans and expectations educations in teach back method with documentation complete? Yes   Right Care Referral Info   Post Acute Recommendation No Care

## 2018-04-13 NOTE — NURSING
Dr. Augustin at bedside and changed surgical dsg.Incision well approximated with steri strips intact

## 2018-04-13 NOTE — DISCHARGE SUMMARY
[facility]  Discharge Summary      Admit Date: 4/12/2018    Discharge Date and Time:  04/13/2018 9:16 AM    Attending Physician: Armando Augustin MD     Reason for Admission: Failure patella fracture status post previous ORIF    Procedures Performed: Procedure(s) (LRB):  OPEN REDUCTION INTERNAL FIXATION-PATELLA REVISION WITH EXTENSOR MECHANISM RECONSTRUCTION (Right) with irrigation debridement cultures    Hospital Course (synopsis of major diagnoses, care, treatment, and services provided during the course of the hospital stay): Presently looks very good.  Incision clear.  No drainage.  Cultures negative so far.  Discussed case with infectious disease at .  We'll let him go home with Cipro and doxycycline.  He will follow up Tuesday.  Await cultures for definitive further antibiotics     Consults: nephrology    Significant Diagnostic Studies: Cultures negative    Final Diagnoses:    Principal Problem: Patella fracture   Secondary Diagnoses:   Active Hospital Problems    Diagnosis  POA    *Patella fracture [S82.009A]  Yes    Patella fracture [S82.009A]  Yes      Resolved Hospital Problems    Diagnosis Date Resolved POA    Post-traumatic wound infection [T14.8XXA, L08.9] 04/13/2018 Yes       Discharged Condition: good    Disposition: Home or Self Care    Follow Up/Patient Instructions:     Medications:  Reconciled Home Medications:      Medication List      START taking these medications    ciprofloxacin HCl 750 MG tablet  Commonly known as:  CIPRO  Take 1 tablet (750 mg total) by mouth every 12 (twelve) hours.     doxycycline 100 MG tablet  Commonly known as:  VIBRA-TABS  Take 1 tablet (100 mg total) by mouth every 12 (twelve) hours.        CONTINUE taking these medications    aspirin 81 MG EC tablet  Commonly known as:  ECOTRIN  Take 81 mg by mouth once daily.     atorvastatin 20 MG tablet  Commonly known as:  LIPITOR  Take 20 mg by mouth once daily.     clopidogrel 75 mg tablet  Commonly  known as:  PLAVIX  Take 75 mg by mouth once daily.     fenofibrate 160 MG Tab  Take 160 mg by mouth once daily.     gabapentin 300 MG capsule  Commonly known as:  NEURONTIN  Take 300 mg by mouth 2 (two) times daily. 1 in the morning, 3 in the evening     insulin glargine 100 unit/mL injection  Commonly known as:  LANTUS  Inject 22 Units into the skin every evening.     IRON ORAL  Take by mouth.     loratadine 10 mg tablet  Commonly known as:  CLARITIN  Take 10 mg by mouth daily as needed for Allergies.     magnesium oxide 400 mg Cap  Take by mouth 2 (two) times daily.     metoprolol succinate 50 MG 24 hr tablet  Commonly known as:  TOPROL-XL  Take 25 mg by mouth once daily. 1/2 tablet in the morning, 1 in the evening     MUCINEX DM ORAL  Take by mouth 2 (two) times daily.     multivitamin capsule  Take 1 capsule by mouth once daily.     NovoLOG U-100 Insulin aspart 100 unit/mL injection  Generic drug:  insulin aspart U-100  Inject into the skin 3 (three) times daily before meals. 16/14/18 units     ONGLYZA 5 mg Tab tablet  Generic drug:  SAXagliptin  Take by mouth once daily.     potassium chloride 10 MEQ Cpsr  Commonly known as:  MICRO-K  Take 10 mEq by mouth once.     rOPINIRole 1 MG tablet  Commonly known as:  REQUIP  Take 1 mg by mouth every evening.     VITAMIN B-12 ORAL  Take by mouth.     VITAMIN C 1000 MG tablet  Generic drug:  ascorbic acid (vitamin C)  Take 1,000 mg by mouth once daily.            Discharge Procedure Orders  Leave dressing on - Keep it clean, dry, and intact until clinic visit       Follow-up Information     Please follow up.    Why:  AS SCHEDULED , Call [phone] to reach Dr. Augustin

## 2018-04-13 NOTE — NURSING
Discharge instructions reviewed with pt and spouse at length and verbalized 100% understanding of instructions. Pt. Instructed to drink plenty of water with new antibiotics and not to take meds within 2 hours of dairy products,antacids etc. Report se to md. Saline lock left wrist dc with cath intact and site without redness or swelling.

## 2018-04-13 NOTE — PLAN OF CARE
Problem: Patient Care Overview  Goal: Plan of Care Review  Outcome: Ongoing (interventions implemented as appropriate)  Patient is s/p OPEN REDUCTION INTERNAL FIXATION-PATELLA REVISION WITH EXTENSOR MECHANISM RECONSTRUCTION POD#1 with knee immobilizer in place at all times. AAOx4, VSS, slightly febrile at midnight with highest temp 99.6 deg F, on RA. No pain medication requests; tolerates 7/10 level. Infection is being managed with scheduled antibiotics. Up with SBA using cane or walker.

## 2018-04-13 NOTE — PROGRESS NOTES
"Physical Therapy Discharge Summary    Name: Luca Camarena III  MRN: [id number]   Principal Problem: Patella fracture     Patient Discharged from acute Physical Therapy on 18.  Please refer to prior PT noted date on 18 for functional status.     Assessment:     Patient appropriate for care in another setting.    Objective:     GOALS:    Physical Therapy Goals        Problem: Physical Therapy Goal    Goal Priority Disciplines Outcome Goal Variances Interventions   Physical Therapy Goal     PT/OT, PT Ongoing (interventions implemented as appropriate)     Description:  Goals to be met by: 18     Patient will increase functional independence with mobility by performin. Supine to sit with supervision.   2. Sit to supine with supervision.   3. Sit<>stand transfer with supervision using rolling walker.   4. Gait > 150 feet with SBA using rolling walker.   5. Ascend/descend 8" curb step  with CGA with RW                      Reasons for Discontinuation of Therapy Services  Transfer to alternate level of care.      Plan:     Patient Discharged to: Home with Home Health Service.    Robert Laura, PT  2018  "

## 2018-04-16 LAB — BACTERIA SPEC AEROBE CULT: NO GROWTH

## 2018-04-23 LAB — BACTERIA SPEC ANAEROBE CULT: NORMAL

## 2018-04-25 ENCOUNTER — ANESTHESIA EVENT (OUTPATIENT)
Dept: SURGERY | Facility: OTHER | Age: 63
End: 2018-04-25
Payer: MEDICARE

## 2018-04-25 RX ORDER — MIDODRINE HYDROCHLORIDE 5 MG/1
2.5 TABLET ORAL 2 TIMES DAILY WITH MEALS
COMMUNITY

## 2018-04-25 NOTE — H&P
Subjective:     About 3 weeks status post revision patella ORIF with suture.  I was concerned regarding infection.  All cultures negative.   Was doing well until he had a syncopal episode and reinjured the knee.  Pulled the patella repair apart again.  Suture was used because of poor skin and severe diabetes I wanted to avoid metal.   It looks like it is inevitable that I use metal fixation at this time.He has had 2 failures    Patient Active Problem List    Diagnosis Date Noted    Patella fracture 04/12/2018    Patella fracture 04/12/2018     Past Medical History:   Diagnosis Date    Anticoagulant long-term use     Coronary artery disease 2004    stents x 3    CRI (chronic renal insufficiency)     Diabetes mellitus     Diaphragm paralysis     Elbow wound 12/01/2017    s/p fall; 3 stitches    Fall 12/01/2017    Fever     Glaucoma     Hearing deficit     Hypertension     Hyponatremia     Legally blind     Restless leg       Past Surgical History:   Procedure Laterality Date    CORONARY STENT PLACEMENT  2004    EYE SURGERY Bilateral     cataract and vitrectomy    foot surgery Bilateral     toe amputations    GASTRIC BYPASS  2011    JOINT REPLACEMENT      KNEE SURGERY Right     x 2    ROTATOR CUFF REPAIR Bilateral     TONSILLECTOMY        No prescriptions prior to admission.     Review of patient's allergies indicates:  No Known Allergies   Social History   Substance Use Topics    Smoking status: Never Smoker    Smokeless tobacco: Never Used    Alcohol use Yes      Comment: rare      No family history on file.   Review of Systems  Pertinent items are noted in HPI.    Objective:     No data found.      Heart regular lungs clear incision healing nicely no evidence of infection.  2 cm gap between the patellar fragments    Imaging Review  Failed patella    Assessment:     There are no hospital problems to display for this patient.      Plan:     The various methods of treatment have been  discussed with the patient and family.   After consideration of risks, benefits and other options for treatment, the patient has consented to surgical interventions (Severe risks discussed  Problems with metal fixation discussed at length).  Questions were answered and Pre-op teaching was done by me.

## 2018-05-03 ENCOUNTER — HOSPITAL ENCOUNTER (OUTPATIENT)
Facility: OTHER | Age: 63
Discharge: HOME | End: 2018-05-03
Attending: ORTHOPAEDIC SURGERY | Admitting: ORTHOPAEDIC SURGERY
Payer: MEDICARE

## 2018-05-03 ENCOUNTER — SURGERY (OUTPATIENT)
Age: 63
End: 2018-05-03

## 2018-05-03 ENCOUNTER — ANESTHESIA (OUTPATIENT)
Dept: SURGERY | Facility: OTHER | Age: 63
End: 2018-05-03
Payer: MEDICARE

## 2018-05-03 VITALS
TEMPERATURE: 98 F | DIASTOLIC BLOOD PRESSURE: 76 MMHG | SYSTOLIC BLOOD PRESSURE: 128 MMHG | RESPIRATION RATE: 18 BRPM | HEART RATE: 81 BPM | OXYGEN SATURATION: 100 % | BODY MASS INDEX: 27.86 KG/M2 | HEIGHT: 71 IN | WEIGHT: 199 LBS

## 2018-05-03 DIAGNOSIS — S82.009A PATELLA FRACTURE: ICD-10-CM

## 2018-05-03 LAB
BASOPHILS # BLD AUTO: 0.04 K/UL
BASOPHILS NFR BLD: 0.4 %
DIFFERENTIAL METHOD: ABNORMAL
EOSINOPHIL # BLD AUTO: 0.6 K/UL
EOSINOPHIL NFR BLD: 5.4 %
ERYTHROCYTE [DISTWIDTH] IN BLOOD BY AUTOMATED COUNT: 19.3 %
GRAM STN SPEC: NORMAL
GRAM STN SPEC: NORMAL
HCT VFR BLD AUTO: 31.2 %
HGB BLD-MCNC: 9.7 G/DL
LYMPHOCYTES # BLD AUTO: 2.5 K/UL
LYMPHOCYTES NFR BLD: 24.6 %
MCH RBC QN AUTO: 24.6 PG
MCHC RBC AUTO-ENTMCNC: 31.1 G/DL
MCV RBC AUTO: 79 FL
MONOCYTES # BLD AUTO: 0.9 K/UL
MONOCYTES NFR BLD: 8.8 %
NEUTROPHILS # BLD AUTO: 6.2 K/UL
NEUTROPHILS NFR BLD: 60.4 %
PLATELET # BLD AUTO: 489 K/UL
PMV BLD AUTO: 8.3 FL
POCT GLUCOSE: 101 MG/DL (ref 70–110)
POCT GLUCOSE: 283 MG/DL (ref 70–110)
POCT GLUCOSE: 91 MG/DL (ref 70–110)
RBC # BLD AUTO: 3.94 M/UL
WBC # BLD AUTO: 10.23 K/UL

## 2018-05-03 PROCEDURE — 87116 MYCOBACTERIA CULTURE: CPT

## 2018-05-03 PROCEDURE — C1769 GUIDE WIRE: HCPCS | Performed by: ORTHOPAEDIC SURGERY

## 2018-05-03 PROCEDURE — 87102 FUNGUS ISOLATION CULTURE: CPT

## 2018-05-03 PROCEDURE — 25000003 PHARM REV CODE 250: Performed by: NURSE ANESTHETIST, CERTIFIED REGISTERED

## 2018-05-03 PROCEDURE — 63600175 PHARM REV CODE 636 W HCPCS: Performed by: ORTHOPAEDIC SURGERY

## 2018-05-03 PROCEDURE — 63600175 PHARM REV CODE 636 W HCPCS: Performed by: NURSE ANESTHETIST, CERTIFIED REGISTERED

## 2018-05-03 PROCEDURE — 25000003 PHARM REV CODE 250: Performed by: ORTHOPAEDIC SURGERY

## 2018-05-03 PROCEDURE — 63600175 PHARM REV CODE 636 W HCPCS: Performed by: NURSE PRACTITIONER

## 2018-05-03 PROCEDURE — 87206 SMEAR FLUORESCENT/ACID STAI: CPT

## 2018-05-03 PROCEDURE — C1713 ANCHOR/SCREW BN/BN,TIS/BN: HCPCS | Performed by: ORTHOPAEDIC SURGERY

## 2018-05-03 PROCEDURE — 63700000 PHARM REV CODE 250 ALT 637 W/O HCPCS: Performed by: NURSE PRACTITIONER

## 2018-05-03 PROCEDURE — 82962 GLUCOSE BLOOD TEST: CPT | Performed by: ORTHOPAEDIC SURGERY

## 2018-05-03 PROCEDURE — 36415 COLL VENOUS BLD VENIPUNCTURE: CPT

## 2018-05-03 PROCEDURE — 87015 SPECIMEN INFECT AGNT CONCNTJ: CPT

## 2018-05-03 PROCEDURE — 37000009 HC ANESTHESIA EA ADD 15 MINS: Performed by: ORTHOPAEDIC SURGERY

## 2018-05-03 PROCEDURE — 87070 CULTURE OTHR SPECIMN AEROBIC: CPT

## 2018-05-03 PROCEDURE — 87205 SMEAR GRAM STAIN: CPT

## 2018-05-03 PROCEDURE — 27800903 OPTIME MED/SURG SUP & DEVICES OTHER IMPLANTS: Performed by: ORTHOPAEDIC SURGERY

## 2018-05-03 PROCEDURE — 71000033 HC RECOVERY, INTIAL HOUR: Performed by: ORTHOPAEDIC SURGERY

## 2018-05-03 PROCEDURE — 27201423 OPTIME MED/SURG SUP & DEVICES STERILE SUPPLY: Performed by: ORTHOPAEDIC SURGERY

## 2018-05-03 PROCEDURE — 37000008 HC ANESTHESIA 1ST 15 MINUTES: Performed by: ORTHOPAEDIC SURGERY

## 2018-05-03 PROCEDURE — 87075 CULTR BACTERIA EXCEPT BLOOD: CPT

## 2018-05-03 PROCEDURE — 36000710: Performed by: ORTHOPAEDIC SURGERY

## 2018-05-03 PROCEDURE — 85025 COMPLETE CBC W/AUTO DIFF WBC: CPT

## 2018-05-03 PROCEDURE — 36000711: Performed by: ORTHOPAEDIC SURGERY

## 2018-05-03 PROCEDURE — 25000003 PHARM REV CODE 250: Performed by: NURSE PRACTITIONER

## 2018-05-03 PROCEDURE — 25000003 PHARM REV CODE 250: Performed by: ANESTHESIOLOGY

## 2018-05-03 DEVICE — MATRIX BONE CELLR VIVIGEN 5CC: Type: IMPLANTABLE DEVICE | Site: KNEE | Status: FUNCTIONAL

## 2018-05-03 DEVICE — SCREW CANNULATED 4.0 X 50: Type: IMPLANTABLE DEVICE | Site: KNEE | Status: FUNCTIONAL

## 2018-05-03 DEVICE — K-WIRE TRCR PT1.25MM D 150MM L: Type: IMPLANTABLE DEVICE | Site: KNEE | Status: FUNCTIONAL

## 2018-05-03 RX ORDER — MIDODRINE HYDROCHLORIDE 2.5 MG/1
2.5 TABLET ORAL 2 TIMES DAILY PRN
Status: DISCONTINUED | OUTPATIENT
Start: 2018-05-03 | End: 2018-05-03 | Stop reason: HOSPADM

## 2018-05-03 RX ORDER — MIDODRINE HYDROCHLORIDE 2.5 MG/1
2.5 TABLET ORAL 2 TIMES DAILY WITH MEALS
Status: DISCONTINUED | OUTPATIENT
Start: 2018-05-03 | End: 2018-05-03

## 2018-05-03 RX ORDER — ONDANSETRON 2 MG/ML
INJECTION INTRAMUSCULAR; INTRAVENOUS
Status: DISCONTINUED | OUTPATIENT
Start: 2018-05-03 | End: 2018-05-03

## 2018-05-03 RX ORDER — OXYCODONE HYDROCHLORIDE 5 MG/1
5 TABLET ORAL
Status: DISCONTINUED | OUTPATIENT
Start: 2018-05-03 | End: 2018-05-03 | Stop reason: HOSPADM

## 2018-05-03 RX ORDER — HYDROCODONE BITARTRATE AND ACETAMINOPHEN 10; 325 MG/1; MG/1
1 TABLET ORAL EVERY 4 HOURS PRN
Status: DISCONTINUED | OUTPATIENT
Start: 2018-05-03 | End: 2018-05-03 | Stop reason: HOSPADM

## 2018-05-03 RX ORDER — ACETAMINOPHEN 500 MG
1000 TABLET ORAL EVERY 6 HOURS PRN
Status: DISCONTINUED | OUTPATIENT
Start: 2018-05-03 | End: 2018-05-03 | Stop reason: HOSPADM

## 2018-05-03 RX ORDER — IBUPROFEN 200 MG
16 TABLET ORAL
Status: DISCONTINUED | OUTPATIENT
Start: 2018-05-03 | End: 2018-05-03 | Stop reason: HOSPADM

## 2018-05-03 RX ORDER — SODIUM CHLORIDE, SODIUM LACTATE, POTASSIUM CHLORIDE, CALCIUM CHLORIDE 600; 310; 30; 20 MG/100ML; MG/100ML; MG/100ML; MG/100ML
INJECTION, SOLUTION INTRAVENOUS CONTINUOUS
Status: DISCONTINUED | OUTPATIENT
Start: 2018-05-03 | End: 2018-05-03 | Stop reason: HOSPADM

## 2018-05-03 RX ORDER — CLOPIDOGREL BISULFATE 75 MG/1
75 TABLET ORAL DAILY
Status: DISCONTINUED | OUTPATIENT
Start: 2018-05-03 | End: 2018-05-03

## 2018-05-03 RX ORDER — SODIUM CHLORIDE 0.9 % (FLUSH) 0.9 %
3 SYRINGE (ML) INJECTION
Status: DISCONTINUED | OUTPATIENT
Start: 2018-05-03 | End: 2018-05-03 | Stop reason: HOSPADM

## 2018-05-03 RX ORDER — ROPINIROLE 1 MG/1
1 TABLET, FILM COATED ORAL NIGHTLY
Status: DISCONTINUED | OUTPATIENT
Start: 2018-05-03 | End: 2018-05-03 | Stop reason: HOSPADM

## 2018-05-03 RX ORDER — METOPROLOL SUCCINATE 25 MG/1
25 TABLET, EXTENDED RELEASE ORAL DAILY
Status: DISCONTINUED | OUTPATIENT
Start: 2018-05-04 | End: 2018-05-03 | Stop reason: HOSPADM

## 2018-05-03 RX ORDER — LIDOCAINE HCL/PF 100 MG/5ML
SYRINGE (ML) INTRAVENOUS
Status: DISCONTINUED | OUTPATIENT
Start: 2018-05-03 | End: 2018-05-03

## 2018-05-03 RX ORDER — FENTANYL CITRATE 50 UG/ML
INJECTION, SOLUTION INTRAMUSCULAR; INTRAVENOUS
Status: DISCONTINUED | OUTPATIENT
Start: 2018-05-03 | End: 2018-05-03

## 2018-05-03 RX ORDER — SODIUM CHLORIDE 0.9 % (FLUSH) 0.9 %
5 SYRINGE (ML) INJECTION
Status: DISCONTINUED | OUTPATIENT
Start: 2018-05-03 | End: 2018-05-03 | Stop reason: HOSPADM

## 2018-05-03 RX ORDER — NEOSTIGMINE METHYLSULFATE 1 MG/ML
INJECTION, SOLUTION INTRAVENOUS
Status: DISCONTINUED | OUTPATIENT
Start: 2018-05-03 | End: 2018-05-03

## 2018-05-03 RX ORDER — GABAPENTIN 300 MG/1
300 CAPSULE ORAL 2 TIMES DAILY
Status: DISCONTINUED | OUTPATIENT
Start: 2018-05-03 | End: 2018-05-03 | Stop reason: HOSPADM

## 2018-05-03 RX ORDER — FENTANYL CITRATE 50 UG/ML
25 INJECTION, SOLUTION INTRAMUSCULAR; INTRAVENOUS EVERY 5 MIN PRN
Status: DISCONTINUED | OUTPATIENT
Start: 2018-05-03 | End: 2018-05-03 | Stop reason: HOSPADM

## 2018-05-03 RX ORDER — HYDROMORPHONE HYDROCHLORIDE 2 MG/ML
0.4 INJECTION, SOLUTION INTRAMUSCULAR; INTRAVENOUS; SUBCUTANEOUS EVERY 5 MIN PRN
Status: DISCONTINUED | OUTPATIENT
Start: 2018-05-03 | End: 2018-05-03 | Stop reason: HOSPADM

## 2018-05-03 RX ORDER — FENOFIBRATE 160 MG/1
160 TABLET ORAL DAILY
Status: DISCONTINUED | OUTPATIENT
Start: 2018-05-03 | End: 2018-05-03 | Stop reason: HOSPADM

## 2018-05-03 RX ORDER — ACETAMINOPHEN 325 MG/1
650 TABLET ORAL EVERY 4 HOURS PRN
Status: DISCONTINUED | OUTPATIENT
Start: 2018-05-03 | End: 2018-05-03 | Stop reason: HOSPADM

## 2018-05-03 RX ORDER — CETIRIZINE HYDROCHLORIDE 10 MG/1
10 TABLET ORAL DAILY
Status: DISCONTINUED | OUTPATIENT
Start: 2018-05-03 | End: 2018-05-03 | Stop reason: HOSPADM

## 2018-05-03 RX ORDER — HYDROCODONE BITARTRATE AND ACETAMINOPHEN 5; 325 MG/1; MG/1
1 TABLET ORAL EVERY 4 HOURS PRN
Status: DISCONTINUED | OUTPATIENT
Start: 2018-05-03 | End: 2018-05-03 | Stop reason: HOSPADM

## 2018-05-03 RX ORDER — INSULIN ASPART 100 [IU]/ML
1-10 INJECTION, SOLUTION INTRAVENOUS; SUBCUTANEOUS
Status: DISCONTINUED | OUTPATIENT
Start: 2018-05-03 | End: 2018-05-03 | Stop reason: HOSPADM

## 2018-05-03 RX ORDER — ROCURONIUM BROMIDE 10 MG/ML
INJECTION, SOLUTION INTRAVENOUS
Status: DISCONTINUED | OUTPATIENT
Start: 2018-05-03 | End: 2018-05-03

## 2018-05-03 RX ORDER — LIDOCAINE HYDROCHLORIDE 10 MG/ML
0.5 INJECTION, SOLUTION EPIDURAL; INFILTRATION; INTRACAUDAL; PERINEURAL ONCE
Status: DISCONTINUED | OUTPATIENT
Start: 2018-05-03 | End: 2018-05-03 | Stop reason: HOSPADM

## 2018-05-03 RX ORDER — SODIUM CHLORIDE 9 MG/ML
INJECTION, SOLUTION INTRAVENOUS CONTINUOUS
Status: DISCONTINUED | OUTPATIENT
Start: 2018-05-03 | End: 2018-05-03 | Stop reason: HOSPADM

## 2018-05-03 RX ORDER — ONDANSETRON 2 MG/ML
4 INJECTION INTRAMUSCULAR; INTRAVENOUS DAILY PRN
Status: DISCONTINUED | OUTPATIENT
Start: 2018-05-03 | End: 2018-05-03 | Stop reason: HOSPADM

## 2018-05-03 RX ORDER — IBUPROFEN 200 MG
24 TABLET ORAL
Status: DISCONTINUED | OUTPATIENT
Start: 2018-05-03 | End: 2018-05-03 | Stop reason: HOSPADM

## 2018-05-03 RX ORDER — MEPERIDINE HYDROCHLORIDE 50 MG/ML
12.5 INJECTION INTRAMUSCULAR; INTRAVENOUS; SUBCUTANEOUS ONCE AS NEEDED
Status: DISCONTINUED | OUTPATIENT
Start: 2018-05-03 | End: 2018-05-03 | Stop reason: HOSPADM

## 2018-05-03 RX ORDER — CEFAZOLIN SODIUM 1 G/3ML
2 INJECTION, POWDER, FOR SOLUTION INTRAMUSCULAR; INTRAVENOUS
Status: COMPLETED | OUTPATIENT
Start: 2018-05-03 | End: 2018-05-03

## 2018-05-03 RX ORDER — CLOPIDOGREL BISULFATE 75 MG/1
75 TABLET ORAL NIGHTLY
Status: DISCONTINUED | OUTPATIENT
Start: 2018-05-03 | End: 2018-05-03 | Stop reason: HOSPADM

## 2018-05-03 RX ORDER — GLYCOPYRROLATE 0.2 MG/ML
INJECTION INTRAMUSCULAR; INTRAVENOUS
Status: DISCONTINUED | OUTPATIENT
Start: 2018-05-03 | End: 2018-05-03

## 2018-05-03 RX ORDER — GLUCAGON 1 MG
1 KIT INJECTION
Status: DISCONTINUED | OUTPATIENT
Start: 2018-05-03 | End: 2018-05-03 | Stop reason: HOSPADM

## 2018-05-03 RX ORDER — ROPIVACAINE HYDROCHLORIDE 5 MG/ML
INJECTION, SOLUTION EPIDURAL; INFILTRATION; PERINEURAL
Status: DISCONTINUED | OUTPATIENT
Start: 2018-05-03 | End: 2018-05-03 | Stop reason: HOSPADM

## 2018-05-03 RX ORDER — METOPROLOL SUCCINATE 25 MG/1
25 TABLET, EXTENDED RELEASE ORAL DAILY
Status: DISCONTINUED | OUTPATIENT
Start: 2018-05-03 | End: 2018-05-03

## 2018-05-03 RX ORDER — ACETAMINOPHEN 10 MG/ML
INJECTION, SOLUTION INTRAVENOUS
Status: DISCONTINUED | OUTPATIENT
Start: 2018-05-03 | End: 2018-05-03

## 2018-05-03 RX ORDER — DOXYCYCLINE HYCLATE 100 MG
100 TABLET ORAL EVERY 12 HOURS
Status: DISCONTINUED | OUTPATIENT
Start: 2018-05-03 | End: 2018-05-03 | Stop reason: HOSPADM

## 2018-05-03 RX ORDER — ATORVASTATIN CALCIUM 20 MG/1
20 TABLET, FILM COATED ORAL DAILY
Status: DISCONTINUED | OUTPATIENT
Start: 2018-05-03 | End: 2018-05-03 | Stop reason: HOSPADM

## 2018-05-03 RX ORDER — PROPOFOL 10 MG/ML
VIAL (ML) INTRAVENOUS
Status: DISCONTINUED | OUTPATIENT
Start: 2018-05-03 | End: 2018-05-03

## 2018-05-03 RX ADMIN — LIDOCAINE HYDROCHLORIDE 100 MG: 20 INJECTION, SOLUTION INTRAVENOUS at 06:05

## 2018-05-03 RX ADMIN — ROPIVACAINE HYDROCHLORIDE 30 ML: 5 INJECTION, SOLUTION EPIDURAL; INFILTRATION; PERINEURAL at 07:05

## 2018-05-03 RX ADMIN — PROPOFOL 200 MG: 10 INJECTION, EMULSION INTRAVENOUS at 06:05

## 2018-05-03 RX ADMIN — NEOSTIGMINE METHYLSULFATE 4 MG: 1 INJECTION INTRAVENOUS at 07:05

## 2018-05-03 RX ADMIN — CETIRIZINE HYDROCHLORIDE 10 MG: 10 TABLET, FILM COATED ORAL at 12:05

## 2018-05-03 RX ADMIN — FENTANYL CITRATE 50 MCG: 50 INJECTION, SOLUTION INTRAMUSCULAR; INTRAVENOUS at 07:05

## 2018-05-03 RX ADMIN — ONDANSETRON 4 MG: 2 INJECTION INTRAMUSCULAR; INTRAVENOUS at 07:05

## 2018-05-03 RX ADMIN — FENOFIBRATE 160 MG: 160 TABLET ORAL at 12:05

## 2018-05-03 RX ADMIN — INSULIN ASPART 6 UNITS: 100 INJECTION, SOLUTION INTRAVENOUS; SUBCUTANEOUS at 12:05

## 2018-05-03 RX ADMIN — DOXYCYCLINE HYCLATE 100 MG: 100 TABLET, COATED ORAL at 12:05

## 2018-05-03 RX ADMIN — GLYCOPYRROLATE 0.8 MG: 0.2 INJECTION, SOLUTION INTRAMUSCULAR; INTRAVENOUS at 07:05

## 2018-05-03 RX ADMIN — ROCURONIUM BROMIDE 30 MG: 10 INJECTION, SOLUTION INTRAVENOUS at 06:05

## 2018-05-03 RX ADMIN — CEFAZOLIN 2 G: 330 INJECTION, POWDER, FOR SOLUTION INTRAMUSCULAR; INTRAVENOUS at 07:05

## 2018-05-03 RX ADMIN — GABAPENTIN 300 MG: 300 CAPSULE ORAL at 12:05

## 2018-05-03 RX ADMIN — SODIUM CHLORIDE, SODIUM LACTATE, POTASSIUM CHLORIDE, AND CALCIUM CHLORIDE: 600; 310; 30; 20 INJECTION, SOLUTION INTRAVENOUS at 06:05

## 2018-05-03 RX ADMIN — FENTANYL CITRATE 100 MCG: 50 INJECTION, SOLUTION INTRAMUSCULAR; INTRAVENOUS at 06:05

## 2018-05-03 RX ADMIN — CARBOXYMETHYLCELLULOSE SODIUM 4 DROP: 2.5 SOLUTION/ DROPS OPHTHALMIC at 06:05

## 2018-05-03 RX ADMIN — ACETAMINOPHEN 1000 MG: 500 TABLET ORAL at 11:05

## 2018-05-03 RX ADMIN — FENTANYL CITRATE 50 MCG: 50 INJECTION, SOLUTION INTRAMUSCULAR; INTRAVENOUS at 08:05

## 2018-05-03 RX ADMIN — ATORVASTATIN CALCIUM 20 MG: 20 TABLET, FILM COATED ORAL at 12:05

## 2018-05-03 RX ADMIN — ACETAMINOPHEN 1000 MG: 10 INJECTION, SOLUTION INTRAVENOUS at 06:05

## 2018-05-03 NOTE — ANESTHESIA POSTPROCEDURE EVALUATION
"Anesthesia Post Evaluation    Patient: Luca Allisonler III    Procedure(s) Performed: Procedure(s) (LRB):  OPEN REDUCTION INTERNAL FIXATION-PATELLA / REVISION  (Right)    Final Anesthesia Type: general  Patient location during evaluation: PACU  Patient participation: Yes- Able to Participate  Level of consciousness: awake and alert  Post-procedure vital signs: reviewed and stable  Pain management: adequate  Airway patency: patent  PONV status at discharge: No PONV  Anesthetic complications: no      Cardiovascular status: blood pressure returned to baseline and stable  Respiratory status: unassisted, spontaneous ventilation and room air  Hydration status: euvolemic  Follow-up not needed.        Visit Vitals  /76 (BP Location: Right arm, Patient Position: Sitting)   Pulse 67   Temp 36.7 °C (98 °F) (Oral)   Resp 18   Ht 5' 11" (1.803 m)   Wt 90.3 kg (199 lb)   SpO2 100%   BMI 27.75 kg/m²       Pain/Oscar Score: Pain Assessment Performed: Yes (5/3/2018  9:00 AM)  Presence of Pain: complains of pain/discomfort (5/3/2018  9:00 AM)  Oscar Score: 10 (5/3/2018  9:00 AM)  Modified Oscar Score: 19 (5/3/2018  9:00 AM)      "

## 2018-05-03 NOTE — ANESTHESIA POSTPROCEDURE EVALUATION
"Anesthesia Post Evaluation    Patient: Luca Allisonler III    Procedure(s) Performed: Procedure(s) (LRB):  OPEN REDUCTION INTERNAL FIXATION-PATELLA / REVISION  (Right)    Final Anesthesia Type: general  Patient location during evaluation: PACU  Patient participation: Yes- Able to Participate  Level of consciousness: awake and alert  Post-procedure vital signs: reviewed and stable  Pain management: adequate  Airway patency: patent  PONV status at discharge: No PONV  Anesthetic complications: no      Cardiovascular status: blood pressure returned to baseline  Respiratory status: unassisted, spontaneous ventilation and room air  Hydration status: euvolemic  Follow-up not needed.        Visit Vitals  /76 (BP Location: Right arm, Patient Position: Sitting)   Pulse 67   Temp 36.7 °C (98 °F) (Oral)   Resp 18   Ht 5' 11" (1.803 m)   Wt 90.3 kg (199 lb)   SpO2 100%   BMI 27.75 kg/m²       Pain/Oscar Score: Pain Assessment Performed: Yes (5/3/2018  9:30 AM)  Presence of Pain: denies (5/3/2018  9:30 AM)  Oscar Score: 10 (5/3/2018  9:00 AM)  Modified Oscar Score: 19 (5/3/2018  9:00 AM)      "

## 2018-05-03 NOTE — BRIEF OP NOTE
[facility]  Brief Operative Note    SUMMARY     Surgery Date: 5/3/2018     Surgeon(s) and Role:     * Armando Augustin MD - Primary    Assisting Surgeon: None    Pre-op Diagnosis:  Closed displaced transverse fracture of right patella, initial encounter [S82.031A]    Post-op Diagnosis:  Post-Op Diagnosis Codes:     * Closed displaced transverse fracture of right patella, initial encounter [S82.031A]    Procedure(s) (LRB):  OPEN REDUCTION INTERNAL FIXATION-PATELLA / REVISION  (Right)    Anesthesia: General    Description of Procedure: pat fx    Description of the findings of the procedure: pat fx    Estimated Blood Loss: * No values recorded between 5/3/2018  7:14 AM and 5/3/2018  8:07 AM *       50  Specimens:   Specimen (12h ago through future)    None

## 2018-05-03 NOTE — ANESTHESIA PREPROCEDURE EVALUATION
05/03/2018  Luca Camarena III is a 63 y.o., male.    Pre-op Assessment    I have reviewed the Patient Summary Reports.     I have reviewed the Nursing Notes.   I have reviewed the Medications.     Review of Systems  Anesthesia Hx:  No problems with previous Anesthesia  History of prior surgery of interest to airway management or planning: Previous anesthesia: General Partial foot amp 2 yrs ago with general anesthesia.  Denies Family Hx of Anesthesia complications.   Denies Personal Hx of Anesthesia complications.   Social:  Non-Smoker    Hematology/Oncology:     Oncology Normal    -- Anemia: Hematology Comments: H/H 8/25 after procedure in December.  Will recheck.  Pt seeing hematology for work-up.  Pt on Iron.    Hematology also evaluating intermittent fevers pt has regularly since December 2017.    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   Renal/:   Chronic Renal Disease, CRI Followed by Nephrology for CRI   Hepatic/GI:  Hepatic/GI Normal    Musculoskeletal:   Arthritis     Neurological:   Neuromuscular Disease,    Endocrine:   Diabetes, type 1, using insulin    Psych:  Psychiatric Normal           Physical Exam  General:  Obesity    Airway/Jaw/Neck:  Airway Findings: Mouth Opening: Normal Tongue: Normal  Mallampati: II      Dental:  Dental Findings: Periodontal disease, Mild, molar caps        Mental Status:  Mental Status Findings:  Cooperative, Alert and Oriented         Anesthesia Plan  Type of Anesthesia, risks & benefits discussed:  Anesthesia Type:  general  Patient's Preference:   Intra-op Monitoring Plan:   Intra-op Monitoring Plan Comments:   Post Op Pain Control Plan:   Post Op Pain  Control Plan Comments:   Induction:   IV  Beta Blocker:         Informed Consent: Patient understands risks and agrees with Anesthesia plan.  Questions answered. Anesthesia consent signed with patient.  ASA Score: 3     Day of Surgery Review of History & Physical:    H&P update referred to the surgeon.     Anesthesia Plan Notes:             Ready For Surgery From Anesthesia Perspective.

## 2018-05-03 NOTE — CONSULTS
Consult Note  IM    Consult Requested By: Armando Augustin MD  Reason for Consult: patella fracture, CAD, CRI, DM, diaphragm paralysis, HTN and anemia    SUBJECTIVE:     History of Present Illness:   63 y.o. male presents with a scheduled right patella repair.  Patient seen in room, wife at bedside.  Recently had patellar repair (4/12/18) and fell 1 week after, requiring surgical intervention.  Denies CP,SOB,F,C,N or V.  Has been wearing a Holter monitor per Dr. Amaya, his cardiologist.  Patient has had reports of dizziness and low BPs following his last surgery.    He has been prescribed Midodrine as his BPs have been as low as 90/50.  Patient is followed by Dr. Rutledge for ID, Dr. Escalera for Endocrine, and Dr. Varela for his nephrology needs.      Past Medical History:   Diagnosis Date    Anticoagulant long-term use     Coronary artery disease 2004    stents x 3    CRI (chronic renal insufficiency)     Diabetes mellitus     Diaphragm paralysis     Elbow wound 12/01/2017    s/p fall; 3 stitches    Fall 12/01/2017    Fever     Glaucoma     Hearing deficit     Hypertension     Hyponatremia     Legally blind     Restless leg     Vertigo      Past Surgical History:   Procedure Laterality Date    CORONARY STENT PLACEMENT  2004    EYE SURGERY Bilateral     cataract and vitrectomy    foot surgery Bilateral     toe amputations    GASTRIC BYPASS  2011    JOINT REPLACEMENT      KNEE SURGERY Right     x 2    ROTATOR CUFF REPAIR Bilateral     TONSILLECTOMY       History reviewed. No pertinent family history.  Social History   Substance Use Topics    Smoking status: Never Smoker    Smokeless tobacco: Never Used    Alcohol use Yes      Comment: rare       Review of patient's allergies indicates:  No Known Allergies     Review of Systems:  Constitutional: No fever or chills  Respiratory: No cough or shortness of breath  Cardiovascular: No chest pain or palpitations  Gastrointestinal: No nausea or  vomiting  Neurological: No confusion or weakness    OBJECTIVE:     Vital Signs (Most Recent)  Temp: 97.5 °F (36.4 °C) (05/03/18 0900)  Pulse: 66 (05/03/18 0900)  Resp: 16 (05/03/18 0900)  BP: 116/60 (05/03/18 0900)  SpO2: 99 % (05/03/18 0900)    Vital Signs Range (Last 24H):  Temp:  [97.5 °F (36.4 °C)-97.8 °F (36.6 °C)]   Pulse:  [62-80]   Resp:  [16]   BP: (116-155)/(60-74)   SpO2:  [98 %-100 %]       Intake/Output Summary (Last 24 hours) at 05/03/18 0909  Last data filed at 05/03/18 0845   Gross per 24 hour   Intake             1000 ml   Output                0 ml   Net             1000 ml       Physical Exam:  General appearance: Well developed, well nourished  Eyes:  Conjunctivae/corneas clear. PERRL.  Lungs: Normal respiratory effort,   clear to auscultation bilaterally   Heart: Regular rate and rhythm, S1, S2 normal, no murmur, rub or fatoumata.  Abdomen: Soft, non-tender non-distended; bowel sounds normal; no masses,  no organomegaly  Extremities: No cyanosis or clubbing. No edema.  Knee immobilizer on right leg, ACE wrap underneath. +2 pulses BLE.  Skin: Skin color, texture, turgor normal. No rashes or lesions, some bruising to left forearm  Neurologic: Normal strength and tone. No focal numbness or weakness       Laboratory:    Reviewed    Diagnostic Results:      ASSESSMENT/PLAN:     1. Patella repair (S82.031A): per ortho and therapy teams.  Patient followed by ID at [facility]. Currently on Cipro and Doxy as outpatient, dicussed with Dr. Augustin and will continue Doxi.  2. Anemia (D64.9): noted on pre-op labs, followed by Dr. Neal.  Will await results of tomorrow's labs. Asymptomatic at this time.  3. CKD (N18.9): presumably CKD Stage 2?  Renally dose meds, avoid nephrotoxins, and monitor I/O's closely.  Followed annually by Dr. Rossy Varela.  4. Hypertension (I12.9): meds with hold parameters  5. T2DM (E11.9): long acting dose decreased to 15 units. Takes 22 at home. Mealtime SSI, patient agreeable to change  in insulin dosing to avoid risk of hypoglycemia.  Followed by Dr. Escalera at [facility].  6. Diaphragm paralysis (J98.6): caution with narcotics  7. CAD (I25.10): continue statin and fenofibrate  8. RLS (G25.81): continue home med  9. DVT prophy: Plavix 75 mg qHS per ortho     Plan:thanks for consult, See above recommendations and orders. Will follow along.     Stable for discharge from medical standpoint.  D/W wife at the bedside.

## 2018-05-03 NOTE — PLAN OF CARE
Spoke to Dr. Augustin at patient's bedside during admit. Patient does not need PT during this visit. Teaching was done during previous surgical visit. Patient and wife understand the need to follow-up in office on Tuesday, May 8th. No changes in weight bearing status or RLE brace. Telemetry removed at time of discharge. Clarified okay to discharge,  KATIE Guzman NP nephrology. Discharge home self care with wife to follow-up next week.

## 2018-05-03 NOTE — TRANSFER OF CARE
"Anesthesia Transfer of Care Note    Patient: Luca Camarena III    Procedure(s) Performed: Procedure(s) (LRB):  OPEN REDUCTION INTERNAL FIXATION-PATELLA / REVISION  (Right)    Patient location: PACU    Anesthesia Type: general    Transport from OR: Transported from OR on 2-3 L/min O2 by NC with adequate spontaneous ventilation    Post pain: adequate analgesia    Post assessment: no apparent anesthetic complications and tolerated procedure well    Post vital signs: stable    Level of consciousness: awake, alert and oriented    Nausea/Vomiting: no nausea/vomiting    Complications: none    Transfer of care protocol was followed      Last vitals:   Visit Vitals  BP (!) 155/74 (BP Location: Right arm, Patient Position: Lying)   Pulse 80   Temp 36.6 °C (97.8 °F) (Oral)   Resp 16   Ht 5' 11" (1.803 m)   Wt 90.3 kg (199 lb)   SpO2 98%   BMI 27.75 kg/m²     "

## 2018-05-03 NOTE — OP NOTE
DATE OF PROCEDURE:  05/03/2018.    CHIEF COMPLAINT AND PRESENT ILLNESS:  The patient is a 63-year-old with   complicated history of right patellar fracture.  I tried to fix it a couple of   times just with sutures and he keeps having these syncopal episodes and tearing   up the repair.  He cannot straighten his knee out, significant diastasis.  I was   worried that he was infected at the second operation, but cultures were all   negative even though  fluid looked kind of funny.  In any event, I think I am   Negative now, I have to put hardware in him and the reason I tried to try to avoid   that is because of his significant diabetes and very poor skin and I was worried   about coverage and erosion and things like that, but he just cannot heal   otherwise.  Significant risks discussed.    PREOPERATIVE DIAGNOSIS:  Right recurrent patellar fracture.    POSTOPERATIVE DIAGNOSIS:  Right recurrent patellar fracture.    PROCEDURE:  ORIF right patellar fracture using A0 4.0 cannulated screws and   18-gauge wire along with sutures and Vivigen bone graft substitute.    SURGEON:  Armando Augustin M.D.    ASSISTANT:  Mirna Garrido CST.    COMPLICATIONS:  None.    ANESTHESIA:  General anesthesia.    TOURNIQUET: No tourniquet was used.    BLOOD LOSS:  About 50 mL.    PROCEDURE IN DETAIL:  The patient was brought to the Operating Room and   underwent general intubation without difficulty.  Right lower extremity was   prepped and draped in the usual sterile fashion.  Using the prior midline   incision, sharp dissection was made down the extensor mechanism.  You could see   the big gap with the 2 patella pieces.  The bone was somewhat soft, but not   terrible.  I took cultures just to be thorough.  Ends of the fractures were   cleaned up and 2 wires were placed parallel and put 4.0 cannulated screws   through the patella.  I put good compression on it with clamps and then secured   those screws then 18-gauge wire was fed through  the screws with a raya-cross   tension band type feel to it.  I then put tention to it.  I bent one of the   screws, which I knew was going to happen, but I need good compression on this,   about 2/3 of the patella had a very good apposition, had a little gap on the   lateral third.  I put some HiFi permanent suture, also to try to augment this   and then put Vivigen graft on top of the patella to fill in any gaps and   hopefully help with heal, took C-arm to show that they were not any hardware in   the joint and the extensor mechanism was intact.  Pulse lavage irrigation was   used before to all of this instrumentation and graft.  I made sure everything   was clean.  A 2-0 Vicryl was then used on the subcutaneous tissue and then just   Steri-Strips on the skin.  He had a little lateral lesion from when his skin had   gotten too thin from the prior operation and I put a little stitch there also,   a simple stitch through and through to help that out.  He was placed in a   well-padded knee immobilizer and brought to recovery in stable fashion.    Recurrent ORIF now with hardware cannulated screws with tension band and a bone   graft substitute Vivigen.            /merced [id number] obdulio(s)        ADRIENNE/IN  dd: 05/03/2018 08:12:09 (CDT)  td: 05/03/2018 12:34:58 (CDT)  Doc ID   #[id number]  Job ID #[id number]    CC:

## 2018-05-04 NOTE — DISCHARGE SUMMARY
[facility]  Discharge Summary      Admit Date: 5/3/2018    Discharge Date and Time: 5/3/2018  4:59 PM    Attending Physician: No att. providers found     Reason for Admission: Failed ORIF right patella    Procedures Performed: Procedure(s) (LRB):  OPEN REDUCTION INTERNAL FIXATION-PATELLA / REVISION  (Right)    Hospital Course (synopsis of major diagnoses, care, treatment, and services provided during the course of the hospital stay): The above patient underwent the above outpatient procedure. The patient tolerated procedure well and will be discharged today.--see orders.       Consults: nephrology    Significant Diagnostic Studies: Labs:   CBC   Recent Labs  Lab 05/03/18  0615   WBC 10.23   HGB 9.7*   HCT 31.2*   *       Final Diagnoses:    Principal Problem: Patella fracture   Secondary Diagnoses:   Active Hospital Problems    Diagnosis  POA    *Patella fracture [S82.009A]  Yes      Resolved Hospital Problems    Diagnosis Date Resolved POA   No resolved problems to display.       Discharged Condition: good    Disposition: Home or Self Care    Follow Up/Patient Instructions:     Medications:  Reconciled Home Medications:      Medication List      CONTINUE taking these medications    aspirin 81 MG EC tablet  Commonly known as:  ECOTRIN  Take 81 mg by mouth once daily.     atorvastatin 20 MG tablet  Commonly known as:  LIPITOR  Take 20 mg by mouth once daily.     ciprofloxacin HCl 750 MG tablet  Commonly known as:  CIPRO  Take 1 tablet (750 mg total) by mouth every 12 (twelve) hours.     clopidogrel 75 mg tablet  Commonly known as:  PLAVIX  Take 75 mg by mouth once daily.     doxycycline 100 MG tablet  Commonly known as:  VIBRA-TABS  Take 1 tablet (100 mg total) by mouth every 12 (twelve) hours.     fenofibrate 160 MG Tab  Take 160 mg by mouth once daily.     gabapentin 300 MG capsule  Commonly known as:  NEURONTIN  Take 300 mg by mouth 2 (two) times daily. 1 in the morning, 3 in the  evening     insulin glargine 100 unit/mL injection  Commonly known as:  LANTUS  Inject 22 Units into the skin every evening.     IRON ORAL  Take by mouth.     loratadine 10 mg tablet  Commonly known as:  CLARITIN  Take 10 mg by mouth daily as needed for Allergies.     magnesium oxide 400 mg Cap  Take by mouth 2 (two) times daily.     metoprolol succinate 50 MG 24 hr tablet  Commonly known as:  TOPROL-XL  Take 25 mg by mouth once daily. 1/2 tablet in the morning, 1 in the evening     midodrine 5 MG Tab  Commonly known as:  PROAMATINE  Take 2.5 mg by mouth 2 (two) times daily with meals.     MUCINEX DM ORAL  Take by mouth 2 (two) times daily.     multivitamin capsule  Take 1 capsule by mouth once daily.     NovoLOG U-100 Insulin aspart 100 unit/mL injection  Generic drug:  insulin aspart U-100  Inject into the skin 3 (three) times daily before meals. 16/14/18 units     ONGLYZA 5 mg Tab tablet  Generic drug:  SAXagliptin  Take by mouth once daily.     potassium chloride 10 MEQ Cpsr  Commonly known as:  MICRO-K  Take 10 mEq by mouth once.     rOPINIRole 1 MG tablet  Commonly known as:  REQUIP  Take 1 mg by mouth every evening.     VITAMIN B-12 ORAL  Take by mouth.     VITAMIN C 1000 MG tablet  Generic drug:  ascorbic acid (vitamin C)  Take 1,000 mg by mouth once daily.          No discharge procedures on file.  Follow-up Information     Please follow up.    Why:  AS SCHEDULED , Call [phone] to reach Dr. Augustin

## 2018-05-07 LAB — BACTERIA SPEC AEROBE CULT: NO GROWTH

## 2018-05-10 LAB — BACTERIA SPEC ANAEROBE CULT: NORMAL

## 2018-06-05 LAB — FUNGUS SPEC CULT: NORMAL

## 2018-07-05 LAB
ACID FAST MOD KINY STN SPEC: NORMAL
MYCOBACTERIUM SPEC QL CULT: NORMAL

## 2020-10-26 LAB
CRP SERPL-MCNC: 106.2 MG/L (ref 0–5)
EXT 24 HR UR METANEPHRINE: ABNORMAL
EXT 24 HR UR NORMETANEPHRINE: ABNORMAL
EXT 24 HR UR NORMETANEPHRINE: ABNORMAL
EXT 25 HYDROXY VIT D2: ABNORMAL
EXT 25 HYDROXY VIT D3: ABNORMAL
EXT 5 HIAA 24 HR URINE: ABNORMAL
EXT 5 HIAA BLOOD: ABNORMAL
EXT ACTH: ABNORMAL
EXT AFP: ABNORMAL
EXT ALBUMIN: 2.8 GRAM/DL (ref 3.5–5)
EXT ALKALINE PHOSPHATASE: 67 UNIT/L (ref 38–126)
EXT ALT: 24 UNIT/L (ref 7–56)
EXT AMYLASE: ABNORMAL
EXT ANTI ISLET CELL AB: ABNORMAL
EXT ANTI PARIETAL CELL AB: ABNORMAL
EXT ANTI THYROID AB: ABNORMAL
EXT AST: 26 UNIT/L (ref 7–40)
EXT BILIRUBIN DIRECT: ABNORMAL
EXT BILIRUBIN TOTAL: 0.5 MG/DL (ref 0–1.2)
EXT BK VIRUS DNA QN PCR: ABNORMAL
EXT BUN: 19 MG/DL (ref 7–21)
EXT C PEPTIDE: ABNORMAL
EXT CA 125: ABNORMAL
EXT CA 19-9: ABNORMAL
EXT CA 27-29: ABNORMAL
EXT CALCITONIN: ABNORMAL
EXT CALCIUM: 8.6 MG/DL (ref 8.5–10.3)
EXT CEA: ABNORMAL
EXT CHLORIDE: 96 MEQ/L (ref 98–107)
EXT CHOLESTEROL: ABNORMAL
EXT CHROMOGRANIN A: ABNORMAL
EXT CO2: 25 MEQ/L (ref 21–31)
EXT CREATININE UA: ABNORMAL
EXT CREATININE: 1.1 MG/DL (ref 0.7–1.2)
EXT CYCLOSPORONE LEVEL: ABNORMAL
EXT DOPAMINE: ABNORMAL
EXT EBV DNA BY PCR: ABNORMAL
EXT EPINEPHRINE: ABNORMAL
EXT FOLATE: ABNORMAL
EXT FREE T3: ABNORMAL
EXT FREE T4: ABNORMAL
EXT FSH: ABNORMAL
EXT GASTRIN RELEASING PEPTIDE: ABNORMAL
EXT GASTRIN RELEASING PEPTIDE: ABNORMAL
EXT GASTRIN: ABNORMAL
EXT GGT: ABNORMAL
EXT GHRELIN: ABNORMAL
EXT GLUCAGON: ABNORMAL
EXT GLUCOSE: 190 MG/DL (ref 70–100)
EXT GROWTH HORMONE: ABNORMAL
EXT HCV RNA QUANT PCR: ABNORMAL
EXT HDL: ABNORMAL
EXT HEMATOCRIT: 34.3 % (ref 40–52)
EXT HEMOGLOBIN A1C: ABNORMAL
EXT HEMOGLOBIN: 11.4 GRAM/DL (ref 13.6–17.5)
EXT HISTAMINE 24 HR URINE: ABNORMAL
EXT HISTAMINE: ABNORMAL
EXT IGF-1: ABNORMAL
EXT IMMUNKNOW (NON-STIMULATED): ABNORMAL
EXT IMMUNKNOW (STIMULATED): ABNORMAL
EXT INR: ABNORMAL
EXT INSULIN: ABNORMAL
EXT LANREOTIDE LEVEL: ABNORMAL
EXT LDH, TOTAL: ABNORMAL
EXT LDL CHOLESTEROL: ABNORMAL
EXT LIPASE: 17 UNIT/L (ref 16–63)
EXT MAGNESIUM: ABNORMAL
EXT METANEPHRINE FREE PLASMA: ABNORMAL
EXT MOTILIN: ABNORMAL
EXT NEUROKININ A CAMB: ABNORMAL
EXT NEUROKININ A ISI: ABNORMAL
EXT NEUROTENSIN: ABNORMAL
EXT NOREPINEPHRINE: ABNORMAL
EXT NORMETANEPHRINE: ABNORMAL
EXT NSE: ABNORMAL
EXT OCTREOTIDE LEVEL: ABNORMAL
EXT PANCREASTATIN CAMB: ABNORMAL
EXT PANCREASTATIN ISI: ABNORMAL
EXT PANCREATIC POLYPEPTIDE: ABNORMAL
EXT PHOSPHORUS: ABNORMAL
EXT PLATELETS: 401 KU/L (ref 150–350)
EXT POTASSIUM: 4.8 MEQ/L (ref 3.5–5)
EXT PROGRAF LEVEL: ABNORMAL
EXT PROLACTIN: ABNORMAL
EXT PROTEIN TOTAL: 6.7 GRAM/DL (ref 6.3–8.2)
EXT PROTEIN UA: ABNORMAL
EXT PT: ABNORMAL
EXT PTH, INTACT: ABNORMAL
EXT PTT: ABNORMAL
EXT RAPAMUNE LEVEL: ABNORMAL
EXT SEROTONIN: ABNORMAL
EXT SODIUM: 130 MEQ/L (ref 135–145)
EXT SOMATOSTATIN: ABNORMAL
EXT SUBSTANCE P: ABNORMAL
EXT TRIGLYCERIDES: ABNORMAL
EXT TRYPTASE: ABNORMAL
EXT TSH: ABNORMAL
EXT URIC ACID: ABNORMAL
EXT URINE AMYLASE U/HR: ABNORMAL
EXT URINE AMYLASE U/L: ABNORMAL
EXT VASOACTIVE INTESTINAL POLYPEPTIDE: ABNORMAL
EXT VITAMIN B12: ABNORMAL
EXT VMA 24 HR URINE: ABNORMAL
EXT WBC: 17.9 KU/L (ref 4.5–11)
NEURON SPECIFIC ENOLASE: ABNORMAL

## 2020-10-27 ENCOUNTER — TELEPHONE (OUTPATIENT)
Dept: NEUROLOGY | Facility: HOSPITAL | Age: 65
End: 2020-10-27

## 2020-10-27 LAB
EXT 24 HR UR METANEPHRINE: ABNORMAL
EXT 24 HR UR NORMETANEPHRINE: ABNORMAL
EXT 24 HR UR NORMETANEPHRINE: ABNORMAL
EXT 25 HYDROXY VIT D2: ABNORMAL
EXT 25 HYDROXY VIT D3: ABNORMAL
EXT 5 HIAA 24 HR URINE: ABNORMAL
EXT 5 HIAA BLOOD: ABNORMAL
EXT ACTH: ABNORMAL
EXT AFP: ABNORMAL
EXT ALBUMIN: 2.5 G/DL (ref 3.5–5)
EXT ALKALINE PHOSPHATASE: 60 U/L (ref 38–126)
EXT ALT: 22 U/L (ref 7–56)
EXT AMYLASE: ABNORMAL
EXT ANTI ISLET CELL AB: ABNORMAL
EXT ANTI PARIETAL CELL AB: ABNORMAL
EXT ANTI THYROID AB: ABNORMAL
EXT AST: 20 U/L (ref 7–40)
EXT BILIRUBIN DIRECT: ABNORMAL
EXT BILIRUBIN TOTAL: 0.3 MG/DL (ref 0–1.2)
EXT BK VIRUS DNA QN PCR: ABNORMAL
EXT BUN: 16 MG/DL (ref 7–21)
EXT C PEPTIDE: ABNORMAL
EXT CA 125: ABNORMAL
EXT CA 19-9: ABNORMAL
EXT CA 27-29: ABNORMAL
EXT CALCITONIN: ABNORMAL
EXT CALCIUM: 8.2 MG/DL (ref 8.5–10.3)
EXT CEA: ABNORMAL
EXT CHLORIDE: 100 MEQ/L (ref 98–107)
EXT CHOLESTEROL: ABNORMAL
EXT CHROMOGRANIN A: ABNORMAL
EXT CO2: 22 MEQ/L (ref 21–31)
EXT CREATININE UA: ABNORMAL
EXT CREATININE: 1 MG/DL (ref 0.7–1.2)
EXT CYCLOSPORONE LEVEL: ABNORMAL
EXT DOPAMINE: ABNORMAL
EXT EBV DNA BY PCR: ABNORMAL
EXT EPINEPHRINE: ABNORMAL
EXT FOLATE: ABNORMAL
EXT FREE T3: ABNORMAL
EXT FREE T4: ABNORMAL
EXT FSH: ABNORMAL
EXT GASTRIN RELEASING PEPTIDE: ABNORMAL
EXT GASTRIN RELEASING PEPTIDE: ABNORMAL
EXT GASTRIN: ABNORMAL
EXT GGT: ABNORMAL
EXT GHRELIN: ABNORMAL
EXT GLUCAGON: ABNORMAL
EXT GLUCOSE: 212 MG/DL (ref 70–100)
EXT GROWTH HORMONE: ABNORMAL
EXT HCV RNA QUANT PCR: ABNORMAL
EXT HDL: ABNORMAL
EXT HEMATOCRIT: 33.2 % (ref 40–52)
EXT HEMOGLOBIN A1C: ABNORMAL
EXT HEMOGLOBIN: 11.3 G/DL (ref 13.6–17.5)
EXT HISTAMINE 24 HR URINE: ABNORMAL
EXT HISTAMINE: ABNORMAL
EXT IGF-1: ABNORMAL
EXT IMMUNKNOW (NON-STIMULATED): ABNORMAL
EXT IMMUNKNOW (STIMULATED): ABNORMAL
EXT INR: 1.2 (ref 0.8–1.2)
EXT INSULIN: ABNORMAL
EXT LANREOTIDE LEVEL: ABNORMAL
EXT LDH, TOTAL: ABNORMAL
EXT LDL CHOLESTEROL: ABNORMAL
EXT LIPASE: ABNORMAL
EXT MAGNESIUM: ABNORMAL
EXT METANEPHRINE FREE PLASMA: ABNORMAL
EXT MOTILIN: ABNORMAL
EXT NEUROKININ A CAMB: ABNORMAL
EXT NEUROKININ A ISI: ABNORMAL
EXT NEUROTENSIN: ABNORMAL
EXT NOREPINEPHRINE: ABNORMAL
EXT NORMETANEPHRINE: ABNORMAL
EXT NSE: ABNORMAL
EXT OCTREOTIDE LEVEL: ABNORMAL
EXT PANCREASTATIN CAMB: ABNORMAL
EXT PANCREASTATIN ISI: ABNORMAL
EXT PANCREATIC POLYPEPTIDE: ABNORMAL
EXT PHOSPHORUS: ABNORMAL
EXT PLATELETS: 366 K/UL (ref 150–350)
EXT POTASSIUM: 4.4 MEQ/L (ref 3.5–5)
EXT PROGRAF LEVEL: ABNORMAL
EXT PROLACTIN: ABNORMAL
EXT PROTEIN TOTAL: 6.2 G/DL (ref 6.3–8.2)
EXT PROTEIN UA: ABNORMAL
EXT PT: 15.2 SEC (ref 12.3–14.7)
EXT PTH, INTACT: ABNORMAL
EXT PTT: 30.3 (ref 22.7–33.4)
EXT RAPAMUNE LEVEL: ABNORMAL
EXT SEROTONIN: ABNORMAL
EXT SODIUM: 133 MEQ/L (ref 135–145)
EXT SOMATOSTATIN: ABNORMAL
EXT SUBSTANCE P: ABNORMAL
EXT TRIGLYCERIDES: ABNORMAL
EXT TRYPTASE: ABNORMAL
EXT TSH: ABNORMAL
EXT URIC ACID: ABNORMAL
EXT URINE AMYLASE U/HR: ABNORMAL
EXT URINE AMYLASE U/L: ABNORMAL
EXT VASOACTIVE INTESTINAL POLYPEPTIDE: ABNORMAL
EXT VITAMIN B12: ABNORMAL
EXT VMA 24 HR URINE: ABNORMAL
EXT WBC: 16.9 KU/L (ref 4.5–11)
NEURON SPECIFIC ENOLASE: ABNORMAL

## 2020-10-27 NOTE — TELEPHONE ENCOUNTER
----- Message from Stiven Wooten sent at 10/26/2020  8:44 AM CDT -----  Contact: patient wife//   [phone]  Luca Camarena WIFE calling to speak with you regarding the patient health issues and she would like to schedule a follow-up appointment              Please advise

## 2020-10-27 NOTE — TELEPHONE ENCOUNTER
Spoke with wife and this pt is back in the hospital at  with an abcess. Told wife that when he is about to get discharged to call and we will set up an appt with Dr Angel

## 2020-11-04 ENCOUNTER — TELEPHONE (OUTPATIENT)
Dept: NEUROLOGY | Facility: HOSPITAL | Age: 65
End: 2020-11-04

## 2020-11-04 NOTE — TELEPHONE ENCOUNTER
----- Message from Rach Gonzalez sent at 11/4/2020  1:08 PM CST -----  Contact: [place], wife, [phone] (M)  States Dr. Ventura told them to call and request an appt for pt to be seen tomorrow afternoon. Please call.

## 2020-11-05 ENCOUNTER — TELEPHONE (OUTPATIENT)
Dept: NEUROLOGY | Facility: HOSPITAL | Age: 65
End: 2020-11-05

## 2020-11-05 ENCOUNTER — TELEPHONE (OUTPATIENT)
Dept: ENDOSCOPY | Facility: HOSPITAL | Age: 65
End: 2020-11-05

## 2020-11-05 ENCOUNTER — OFFICE VISIT (OUTPATIENT)
Dept: NEUROLOGY | Facility: HOSPITAL | Age: 65
End: 2020-11-05
Attending: SURGERY
Payer: MEDICARE

## 2020-11-05 VITALS
BODY MASS INDEX: 31.95 KG/M2 | HEART RATE: 92 BPM | SYSTOLIC BLOOD PRESSURE: 129 MMHG | TEMPERATURE: 98 F | WEIGHT: 228.19 LBS | DIASTOLIC BLOOD PRESSURE: 52 MMHG | HEIGHT: 71 IN

## 2020-11-05 DIAGNOSIS — Z09 POSTOP CHECK: Primary | ICD-10-CM

## 2020-11-05 DIAGNOSIS — K83.9 BILE LEAK: Primary | ICD-10-CM

## 2020-11-05 PROCEDURE — 99215 OFFICE O/P EST HI 40 MIN: CPT | Performed by: SURGERY

## 2020-11-05 RX ORDER — INFLUENZA A VIRUS A/MICHIGAN/45/2015 X-275 (H1N1) ANTIGEN (FORMALDEHYDE INACTIVATED), INFLUENZA A VIRUS A/SINGAPORE/INFIMH-16-0019/2016 IVR-186 (H3N2) ANTIGEN (FORMALDEHYDE INACTIVATED), INFLUENZA B VIRUS B/PHUKET/3073/2013 ANTIGEN (FORMALDEHYDE INACTIVATED), AND INFLUENZA B VIRUS B/MARYLAND/15/2016 BX-69A ANTIGEN (FORMALDEHYDE INACTIVATED) 60; 60; 60; 60 UG/.7ML; UG/.7ML; UG/.7ML; UG/.7ML
INJECTION, SUSPENSION INTRAMUSCULAR
COMMUNITY
Start: 2020-09-24

## 2020-11-05 RX ORDER — HUMAN INSULIN 100 [IU]/ML
INJECTION, SUSPENSION SUBCUTANEOUS
COMMUNITY
Start: 2020-08-12 | End: 2020-11-05 | Stop reason: SDUPTHER

## 2020-11-05 RX ORDER — FLASH GLUCOSE SENSOR
KIT MISCELLANEOUS
COMMUNITY
Start: 2020-09-13

## 2020-11-05 RX ORDER — LINEZOLID 600 MG/1
TABLET, FILM COATED ORAL
COMMUNITY
Start: 2020-11-04

## 2020-11-05 RX ORDER — AMOXICILLIN AND CLAVULANATE POTASSIUM 875; 125 MG/1; MG/1
TABLET, FILM COATED ORAL
Status: ON HOLD | COMMUNITY
Start: 2020-10-22 | End: 2020-11-23

## 2020-11-05 RX ORDER — LOTEPREDNOL ETABONATE 3.8 MG/G
GEL OPHTHALMIC
COMMUNITY
Start: 2020-09-22

## 2020-11-05 RX ORDER — SACCHAROMYCES BOULARDII 250 MG
CAPSULE ORAL
COMMUNITY
Start: 2020-11-04

## 2020-11-05 RX ORDER — NEPAFENAC 3 MG/ML
SUSPENSION/ DROPS OPHTHALMIC
COMMUNITY
Start: 2020-09-22

## 2020-11-05 NOTE — PATIENT INSTRUCTIONS
The name of the [facility] doctor is Dr. Raomn Velez    The number is [phone]   is Kimberly Bowen

## 2020-11-05 NOTE — PROGRESS NOTES
" NOLANETS:  [facility]  A collaboration between [facility] and [facility]        Chief Complaint:  post op follow up    Mr. Camarena underwent robotic cholecystectomy about 10 days back.  he is status post a gastric bypass  If he was found to have gangrenous and necrotic cholecystitis.  I did a antegrade dissection of the gallbladder and underwent uneventful cholecystectomy.  He was discharged in 2 days time.    He came week later for unrelated complaints a CT scan of the abdomen showed fluid collection which an aspiration was bile.  He underwent HIDA scan initially which did not show any leak however subsequent HIDA scan showed a leak he underwent PTC which was unsuccessful however it cost hematoma inside the liver requiring blood units to be transfused.    Because of his gastric bypass status he cannot have ERCP.  His options are ever open exploration are a combined laparoscopic and endoscopic access to the remnant limb and attempt ERCP or edge Procedure    The the leak may be air from the fall swallowing clip from the necrotic cystic duct are from the accessory duct which is which may be leaking bile.  However the HIDA scan shows intact biliary system with the intact bile duct and radionuclide uptake in the small bowel.    He was [facility].  The plan is to proceed with edge Procedure.  This morning he is feeling okay overall eating well he is draining about 200 cc per shift on the bile    S/p:    10/19/20      Pathology:     Vital Sign:   Vitals:    11/05/20 0919   BP: (!) 129/52   Pulse: 92   Temp: 97.8 °F (36.6 °C)   TempSrc: Oral   Weight: 103.5 kg (228 lb 2.8 oz)   Height: 5' 11" (1.803 m)       Incision: healing well    Appetite: fair    Restrictions: NO Restrictions    Return to clinic: 2 weeks      Dr.abdul aubree gonzalez will be performing the edge procedure.  It scheduled for " Tuesday per he will talked about the procedure and the risks involved over the telephone.  I briefly talked about the procedure and expectations.  Informed which a 3 step procedure which will address this leak issue endoscopy gallium minimally invasive manner.    Ackerman any point he has any fever abdominal pain not feeling well he is clearly told to come to the emergency room.  He is on IV antibiotics.  His diarrhea has improved however still has it.  The the plan will be to proceed with this procedure on Tuesday and has in the 2nd stage stent in the bile duct to be placed and 3rd stage the stent will be removed followed by removal of the stent between the gastric pouch and the remnant limb

## 2020-11-05 NOTE — TELEPHONE ENCOUNTER
Spoke with  Wife about the drainage bags that we supplied the pt with. I informed her that our bags were a little different in that our bags had a drain spout that can be drained without changing the whole system. She verbalized understanding

## 2020-11-06 ENCOUNTER — TELEPHONE (OUTPATIENT)
Dept: ENDOSCOPY | Facility: HOSPITAL | Age: 65
End: 2020-11-06

## 2020-11-06 ENCOUNTER — TELEPHONE (OUTPATIENT)
Dept: NEUROLOGY | Facility: HOSPITAL | Age: 65
End: 2020-11-06

## 2020-11-06 DIAGNOSIS — K83.9 BILE LEAK: ICD-10-CM

## 2020-11-06 RX ORDER — INSULIN GLARGINE 100 [IU]/ML
20 INJECTION, SOLUTION SUBCUTANEOUS NIGHTLY
COMMUNITY

## 2020-11-06 NOTE — TELEPHONE ENCOUNTER
Spoke with wife and she said the pt was doing good. Still having heavy drainage but home health came today. Pt a little tired but he is doing more walking

## 2020-11-06 NOTE — TELEPHONE ENCOUNTER
Spoke with patient's wife about instructions for UEUS/ERCP scheduled 11/10/20 at 0800 and covid-19 test 11/7/20 at 0945 at [facility].  Patient has already been off Plavix and ASA.

## 2020-11-07 ENCOUNTER — LAB VISIT (OUTPATIENT)
Dept: URGENT CARE | Facility: CLINIC | Age: 65
End: 2020-11-07
Payer: MEDICARE

## 2020-11-07 DIAGNOSIS — K83.9 BILE LEAK: ICD-10-CM

## 2020-11-07 PROCEDURE — U0003 INFECTIOUS AGENT DETECTION BY NUCLEIC ACID (DNA OR RNA); SEVERE ACUTE RESPIRATORY SYNDROME CORONAVIRUS 2 (SARS-COV-2) (CORONAVIRUS DISEASE [COVID-19]), AMPLIFIED PROBE TECHNIQUE, MAKING USE OF HIGH THROUGHPUT TECHNOLOGIES AS DESCRIBED BY CMS-2020-01-R: HCPCS

## 2020-11-08 LAB — SARS-COV-2 RNA RESP QL NAA+PROBE: NOT DETECTED

## 2020-11-09 ENCOUNTER — TELEPHONE (OUTPATIENT)
Dept: NEUROLOGY | Facility: HOSPITAL | Age: 65
End: 2020-11-09

## 2020-11-09 ENCOUNTER — ANESTHESIA EVENT (OUTPATIENT)
Dept: ENDOSCOPY | Facility: HOSPITAL | Age: 65
End: 2020-11-09
Payer: MEDICARE

## 2020-11-09 NOTE — TELEPHONE ENCOUNTER
Spoke with wife and she said that this pt is doing good. No fever, bp in good.  Drinking fluids eating good.  having problem with diarrhea. Pt walking but he gets tired in the afternoon. Relayed this to Dr. Angel

## 2020-11-09 NOTE — ANESTHESIA PREPROCEDURE EVALUATION
Pre-operative evaluation for Procedure(s) (LRB):  ULTRASOUND, UPPER GI TRACT, ENDOSCOPIC (N/A)  ERCP (ENDOSCOPIC RETROGRADE CHOLANGIOPANCREATOGRAPHY) (N/A)    Vel Leggett III is a 65 y.o. male with pmh of gastric bypass who presents after robotic cholecystectomy for necrotic cholecystitis ~2 weeks ago now with a bile leak. Plan for the above procedure.     Results for VEL LEGGETT III (MRN [id number]) as of 11/10/2020 07:04   Ref. Range 11/7/2020 09:50   SARS-CoV2 (COVID-19) Qualitative PCR Latest Ref Range: Not Detected  Not Detected       2D Echo:  None on file    Patient Active Problem List   Diagnosis    Patella fracture    Patella fracture        No current facility-administered medications on file prior to encounter.      Current Outpatient Medications on File Prior to Encounter   Medication Sig Dispense Refill    amoxicillin-clavulanate 875-125mg (AUGMENTIN) 875-125 mg per tablet TK 1 T PO Q 12 H FOR 7 DAYS      ascorbic acid, vitamin C, (VITAMIN C) 1000 MG tablet Take 1,000 mg by mouth once daily.      aspirin (ECOTRIN) 81 MG EC tablet Take 81 mg by mouth once daily.      atorvastatin (LIPITOR) 20 MG tablet Take 20 mg by mouth once daily.      ciprofloxacin HCl (CIPRO) 750 MG tablet Take 1 tablet (750 mg total) by mouth every 12 (twelve) hours. 30 tablet 0    clopidogrel (PLAVIX) 75 mg tablet Take 75 mg by mouth once daily.      CYANOCOBALAMIN, VITAMIN B-12, (VITAMIN B-12 ORAL) Take by mouth.      doxycycline (VIBRA-TABS) 100 MG tablet Take 1 tablet (100 mg total) by mouth every 12 (twelve) hours. 30 tablet 0    fenofibrate 160 MG Tab Take 160 mg by mouth once daily.      FERROUS SULFATE (IRON ORAL) Take by mouth.      FLORASTOR 250 mg capsule       FLUZONE HIGHDOSE QUAD 20-21  mcg/0.7 mL Syrg ADM 0.7ML IM UTD      FREESTYLE RUBY 14 DAY SENSOR Kit USE TO CHECK BLOOD GLUCOSE DAILY AND CHANGE SENSOR EVERY 14 DAYS      gabapentin (NEURONTIN) 300 MG capsule Take 300 mg by mouth  2 (two) times daily. 1 in the morning, 3 in the evening      GUAIFENESIN/DEXTROMETHORPHAN (MUCINEX DM ORAL) Take by mouth 2 (two) times daily.      ILEVRO 0.3 % DrpS SHAKE LQ AND INT 1 GTT IN OD QD      insulin aspart (NOVOLOG) 100 unit/mL injection Inject into the skin 3 (three) times daily before meals. 16/14/18 units      linezolid (ZYVOX) 600 mg Tab       loratadine (CLARITIN) 10 mg tablet Take 10 mg by mouth daily as needed for Allergies.      LOTEMAX SM 0.38 % DrpG INT 1 GTT IN OD BID      magnesium oxide 400 mg Cap Take by mouth 2 (two) times daily.      metoprolol succinate (TOPROL-XL) 50 MG 24 hr tablet Take 25 mg by mouth once daily. 1/2 tablet in the morning, 1 in the evening       midodrine (PROAMATINE) 5 MG Tab Take 2.5 mg by mouth 2 (two) times daily with meals.      multivitamin capsule Take 1 capsule by mouth once daily.      potassium chloride (MICRO-K) 10 MEQ CpSR Take 10 mEq by mouth once.      rOPINIRole (REQUIP) 1 MG tablet Take 1 mg by mouth every evening.         Past Surgical History:   Procedure Laterality Date    CORONARY STENT PLACEMENT  2004    EYE SURGERY Bilateral     cataract and vitrectomy    foot surgery Bilateral     toe amputations    GASTRIC BYPASS  2011    JOINT REPLACEMENT      KNEE SURGERY Right     x 2    ROTATOR CUFF REPAIR Bilateral     TONSILLECTOMY           Anesthesia Evaluation    I have reviewed the Patient Summary Reports.    I have reviewed the Nursing Notes. I have reviewed the NPO Status.   I have reviewed the Medications.     Review of Systems  Anesthesia Hx:   Denies Personal Hx of Anesthesia complications.   Cardiovascular:   Hypertension Denies MI. CAD    Denies Dysrhythmias.  hyperlipidemia    Pulmonary:   Denies COPD.  Denies Asthma.  Denies Shortness of breath.  Denies Sleep Apnea.    Renal/:   Chronic Renal Disease Results for LEGGETTVEL III (MRN [id number]) as of 11/10/2020 07:04    10/27/2020 00:00  EXT Creatinine: 1.0   Hepatic/GI:    Denies Liver Disease.    Neurological:   Denies TIA. Denies CVA. Denies Seizures.    Endocrine:   Diabetes Denies Hypothyroidism. Denies Hyperthyroidism.        Physical Exam  General:  Well nourished, Obesity    Airway/Jaw/Neck:  Airway Findings: Mouth Opening: Normal Tongue: Normal  General Airway Assessment: Adult  Mallampati: II  TM Distance: Normal, at least 6 cm     Eyes/Ears/Nose:  Eyes/Ears/Nose Findings:  Leagally blind   Dental:  Dental Findings: In tact        Mental Status:  Mental Status Findings: Normal        Anesthesia Plan  Type of Anesthesia, risks & benefits discussed:  Anesthesia Type:  general  Patient's Preference:   Intra-op Monitoring Plan: standard ASA monitors  Intra-op Monitoring Plan Comments:   Post Op Pain Control Plan: multimodal analgesia and IV/PO Opioids PRN  Post Op Pain Control Plan Comments:   Induction:   IV  Beta Blocker:  Patient is on a Beta-Blocker and has received one dose within the past 24 hours (No further documentation required).       Informed Consent: Patient understands risks and agrees with Anesthesia plan.  Questions answered. Anesthesia consent signed with patient.  ASA Score: 3     Day of Surgery Review of History & Physical:            Ready For Surgery From Anesthesia Perspective.

## 2020-11-10 ENCOUNTER — HOSPITAL ENCOUNTER (OUTPATIENT)
Facility: HOSPITAL | Age: 65
Discharge: HOME | End: 2020-11-10
Attending: INTERNAL MEDICINE | Admitting: INTERNAL MEDICINE
Payer: MEDICARE

## 2020-11-10 ENCOUNTER — ANESTHESIA (OUTPATIENT)
Dept: ENDOSCOPY | Facility: HOSPITAL | Age: 65
End: 2020-11-10
Payer: MEDICARE

## 2020-11-10 VITALS
HEIGHT: 71 IN | TEMPERATURE: 98 F | SYSTOLIC BLOOD PRESSURE: 121 MMHG | BODY MASS INDEX: 32.06 KG/M2 | HEART RATE: 70 BPM | WEIGHT: 229 LBS | DIASTOLIC BLOOD PRESSURE: 59 MMHG | RESPIRATION RATE: 15 BRPM | OXYGEN SATURATION: 99 %

## 2020-11-10 DIAGNOSIS — K83.8 BILE LEAK, POSTOPERATIVE: ICD-10-CM

## 2020-11-10 DIAGNOSIS — K91.89 BILE LEAK, POSTOPERATIVE: ICD-10-CM

## 2020-11-10 LAB
GLUCOSE SERPL-MCNC: 63 MG/DL (ref 70–110)
POCT GLUCOSE: 63 MG/DL (ref 70–110)
POCT GLUCOSE: 73 MG/DL (ref 70–110)
POCT GLUCOSE: 74 MG/DL (ref 70–110)

## 2020-11-10 PROCEDURE — 43237 PR ENDOSCOPIC US EXAM, ESOPH: ICD-10-PCS | Mod: 51,,, | Performed by: INTERNAL MEDICINE

## 2020-11-10 PROCEDURE — 43237 ENDOSCOPIC US EXAM ESOPH: CPT | Performed by: INTERNAL MEDICINE

## 2020-11-10 PROCEDURE — 82962 GLUCOSE BLOOD TEST: CPT | Performed by: INTERNAL MEDICINE

## 2020-11-10 PROCEDURE — 27202059 HC NEEDLE, FNA (ANY): Performed by: INTERNAL MEDICINE

## 2020-11-10 PROCEDURE — C1726 CATH, BAL DIL, NON-VASCULAR: HCPCS | Performed by: INTERNAL MEDICINE

## 2020-11-10 PROCEDURE — 43240 EGD W/TRANSMURAL DRAIN CYST: CPT | Performed by: INTERNAL MEDICINE

## 2020-11-10 PROCEDURE — 74360 X-RAY GUIDE GI DILATION: CPT | Mod: 26,,, | Performed by: INTERNAL MEDICINE

## 2020-11-10 PROCEDURE — 74360 X-RAY GUIDE GI DILATION: CPT | Performed by: INTERNAL MEDICINE

## 2020-11-10 PROCEDURE — 74360 PR  X-RAY GUIDE, GI DILATION: ICD-10-PCS | Mod: 26,,, | Performed by: INTERNAL MEDICINE

## 2020-11-10 PROCEDURE — 43237 ENDOSCOPIC US EXAM ESOPH: CPT | Mod: 51,,, | Performed by: INTERNAL MEDICINE

## 2020-11-10 PROCEDURE — 25500020 PHARM REV CODE 255: Performed by: INTERNAL MEDICINE

## 2020-11-10 PROCEDURE — D9220A PRA ANESTHESIA: ICD-10-PCS | Mod: ANES,,, | Performed by: SURGERY

## 2020-11-10 PROCEDURE — 43240 EGD W/TRANSMURAL DRAIN CYST: CPT | Mod: ,,, | Performed by: INTERNAL MEDICINE

## 2020-11-10 PROCEDURE — 63600175 PHARM REV CODE 636 W HCPCS: Performed by: SURGERY

## 2020-11-10 PROCEDURE — 37000008 HC ANESTHESIA 1ST 15 MINUTES: Performed by: INTERNAL MEDICINE

## 2020-11-10 PROCEDURE — 43240 PR UPPER GI ENDOSCOPY,DRAIN PSEUDOCYST: ICD-10-PCS | Mod: ,,, | Performed by: INTERNAL MEDICINE

## 2020-11-10 PROCEDURE — 43245 PR EGD, FLEX, W/DILATION, GASTR/DUOD STRICTURE: ICD-10-PCS | Mod: 51,,, | Performed by: INTERNAL MEDICINE

## 2020-11-10 PROCEDURE — 25000003 PHARM REV CODE 250: Performed by: SURGERY

## 2020-11-10 PROCEDURE — C1769 GUIDE WIRE: HCPCS | Performed by: INTERNAL MEDICINE

## 2020-11-10 PROCEDURE — 25000003 PHARM REV CODE 250: Performed by: INTERNAL MEDICINE

## 2020-11-10 PROCEDURE — D9220A PRA ANESTHESIA: Mod: ANES,,, | Performed by: SURGERY

## 2020-11-10 PROCEDURE — 43245 EGD DILATE STRICTURE: CPT | Mod: 51,,, | Performed by: INTERNAL MEDICINE

## 2020-11-10 PROCEDURE — C1874 STENT, COATED/COV W/DEL SYS: HCPCS | Performed by: INTERNAL MEDICINE

## 2020-11-10 PROCEDURE — 43245 EGD DILATE STRICTURE: CPT | Performed by: INTERNAL MEDICINE

## 2020-11-10 PROCEDURE — 25000003 PHARM REV CODE 250: Performed by: STUDENT IN AN ORGANIZED HEALTH CARE EDUCATION/TRAINING PROGRAM

## 2020-11-10 PROCEDURE — D9220A PRA ANESTHESIA: ICD-10-PCS | Mod: CRNA,,, | Performed by: STUDENT IN AN ORGANIZED HEALTH CARE EDUCATION/TRAINING PROGRAM

## 2020-11-10 PROCEDURE — 63600175 PHARM REV CODE 636 W HCPCS: Performed by: STUDENT IN AN ORGANIZED HEALTH CARE EDUCATION/TRAINING PROGRAM

## 2020-11-10 PROCEDURE — D9220A PRA ANESTHESIA: Mod: CRNA,,, | Performed by: STUDENT IN AN ORGANIZED HEALTH CARE EDUCATION/TRAINING PROGRAM

## 2020-11-10 PROCEDURE — 37000009 HC ANESTHESIA EA ADD 15 MINS: Performed by: INTERNAL MEDICINE

## 2020-11-10 PROCEDURE — 43259 EGD US EXAM DUODENUM/JEJUNUM: CPT | Performed by: INTERNAL MEDICINE

## 2020-11-10 RX ORDER — ROCURONIUM BROMIDE 10 MG/ML
INJECTION, SOLUTION INTRAVENOUS
Status: DISCONTINUED | OUTPATIENT
Start: 2020-11-10 | End: 2020-11-10

## 2020-11-10 RX ORDER — PROPOFOL 10 MG/ML
VIAL (ML) INTRAVENOUS
Status: DISCONTINUED | OUTPATIENT
Start: 2020-11-10 | End: 2020-11-10

## 2020-11-10 RX ORDER — DEXMEDETOMIDINE HYDROCHLORIDE 100 UG/ML
INJECTION, SOLUTION INTRAVENOUS
Status: DISCONTINUED | OUTPATIENT
Start: 2020-11-10 | End: 2020-11-10

## 2020-11-10 RX ORDER — SODIUM CHLORIDE 9 MG/ML
INJECTION, SOLUTION INTRAVENOUS CONTINUOUS
Status: DISCONTINUED | OUTPATIENT
Start: 2020-11-10 | End: 2020-11-10 | Stop reason: HOSPADM

## 2020-11-10 RX ORDER — DEXAMETHASONE SODIUM PHOSPHATE 4 MG/ML
INJECTION, SOLUTION INTRA-ARTICULAR; INTRALESIONAL; INTRAMUSCULAR; INTRAVENOUS; SOFT TISSUE
Status: DISCONTINUED | OUTPATIENT
Start: 2020-11-10 | End: 2020-11-10

## 2020-11-10 RX ORDER — PHENYLEPHRINE HYDROCHLORIDE 10 MG/ML
INJECTION INTRAVENOUS
Status: DISCONTINUED | OUTPATIENT
Start: 2020-11-10 | End: 2020-11-10

## 2020-11-10 RX ORDER — MIDAZOLAM HYDROCHLORIDE 1 MG/ML
INJECTION, SOLUTION INTRAMUSCULAR; INTRAVENOUS
Status: DISCONTINUED | OUTPATIENT
Start: 2020-11-10 | End: 2020-11-10

## 2020-11-10 RX ORDER — SODIUM CHLORIDE 0.9 % (FLUSH) 0.9 %
10 SYRINGE (ML) INJECTION
Status: DISCONTINUED | OUTPATIENT
Start: 2020-11-10 | End: 2020-11-10 | Stop reason: HOSPADM

## 2020-11-10 RX ORDER — ONDANSETRON 2 MG/ML
INJECTION INTRAMUSCULAR; INTRAVENOUS
Status: DISCONTINUED | OUTPATIENT
Start: 2020-11-10 | End: 2020-11-10

## 2020-11-10 RX ADMIN — IOHEXOL 200 ML: 300 INJECTION, SOLUTION INTRAVENOUS at 09:11

## 2020-11-10 RX ADMIN — SUGAMMADEX 200 MG: 100 INJECTION, SOLUTION INTRAVENOUS at 09:11

## 2020-11-10 RX ADMIN — ONDANSETRON 4 MG: 2 INJECTION, SOLUTION INTRAMUSCULAR; INTRAVENOUS at 08:11

## 2020-11-10 RX ADMIN — PHENYLEPHRINE HYDROCHLORIDE 200 MCG: 10 INJECTION INTRAVENOUS at 09:11

## 2020-11-10 RX ADMIN — PHENYLEPHRINE HYDROCHLORIDE 100 MCG: 10 INJECTION INTRAVENOUS at 08:11

## 2020-11-10 RX ADMIN — DEXMEDETOMIDINE HYDROCHLORIDE 4 MCG: 100 INJECTION, SOLUTION, CONCENTRATE INTRAVENOUS at 08:11

## 2020-11-10 RX ADMIN — DEXAMETHASONE SODIUM PHOSPHATE 4 MG: 4 INJECTION, SOLUTION INTRAMUSCULAR; INTRAVENOUS at 08:11

## 2020-11-10 RX ADMIN — SODIUM CHLORIDE: 0.9 INJECTION, SOLUTION INTRAVENOUS at 07:11

## 2020-11-10 RX ADMIN — MIDAZOLAM HYDROCHLORIDE 2 MG: 1 INJECTION, SOLUTION INTRAMUSCULAR; INTRAVENOUS at 08:11

## 2020-11-10 RX ADMIN — PROPOFOL 150 MG: 10 INJECTION, EMULSION INTRAVENOUS at 08:11

## 2020-11-10 RX ADMIN — ROCURONIUM BROMIDE 50 MG: 10 INJECTION, SOLUTION INTRAVENOUS at 08:11

## 2020-11-10 RX ADMIN — DEXMEDETOMIDINE HYDROCHLORIDE 4 MCG: 100 INJECTION, SOLUTION, CONCENTRATE INTRAVENOUS at 09:11

## 2020-11-10 NOTE — TRANSFER OF CARE
"Anesthesia Transfer of Care Note    Patient: Luca MATOS Camarena III    Procedure(s) Performed: Procedure(s) (LRB):  ULTRASOUND, UPPER GI TRACT, ENDOSCOPIC (N/A)    Patient location: PACU    Anesthesia Type: general    Transport from OR: Transported from OR on 6-10 L/min O2 by face mask with adequate spontaneous ventilation    Post pain: adequate analgesia    Post assessment: no apparent anesthetic complications and tolerated procedure well    Post vital signs: stable    Level of consciousness: awake, alert and oriented    Nausea/Vomiting: no nausea/vomiting    Complications: none    Transfer of care protocol was followed      Last vitals:   Visit Vitals  /63 (BP Location: Left arm, Patient Position: Lying)   Pulse 82   Temp 36.6 °C (97.9 °F) (Temporal)   Resp 17   Ht 5' 11" (1.803 m)   Wt 103.9 kg (229 lb)   SpO2 99%   BMI 31.94 kg/m²     "

## 2020-11-10 NOTE — ANESTHESIA POSTPROCEDURE EVALUATION
Anesthesia Post Evaluation    Patient: Luca MATOS Camarena III    Procedure(s) Performed: Procedure(s) (LRB):  ULTRASOUND, UPPER GI TRACT, ENDOSCOPIC (N/A)    Final Anesthesia Type: general    Patient location during evaluation: PACU  Patient participation: Yes- Able to Participate  Level of consciousness: awake and alert  Post-procedure vital signs: reviewed and stable  Pain management: adequate  Airway patency: patent    PONV status at discharge: No PONV  Anesthetic complications: no      Cardiovascular status: blood pressure returned to baseline  Respiratory status: unassisted and spontaneous ventilation  Hydration status: euvolemic  Follow-up not needed.          Vitals Value Taken Time   /58 11/10/20 1031   Temp 36.7 °C (98.1 °F) 11/10/20 0935   Pulse 70 11/10/20 1034   Resp 18 11/10/20 1034   SpO2 99 % 11/10/20 1034   Vitals shown include unvalidated device data.      No case tracking events are documented in the log.      Pain/Oscar Score: Oscar Score: 10 (11/10/2020 10:00 AM)

## 2020-11-10 NOTE — ANESTHESIA PROCEDURE NOTES
Intubation  Performed by: Sheba Zepeda CRNA  Authorized by: Sotero Ocampo MD     Intubation:     Induction:  Intravenous    Intubated:  Postinduction    Mask Ventilation:  Easy mask    Attempts:  1    Attempted By:  CRNA    Method of Intubation:  Direct    Blade:  Bob 4    Laryngeal View Grade: Grade I - full view of chords      Difficult Airway Encountered?: No      Complications:  None    Airway Device:  Oral endotracheal tube    Airway Device Size:  7.5    Style/Cuff Inflation:  Cuffed (inflated to minimal occlusive pressure)    Tube secured:  23    Secured at:  The lips    Placement Verified By:  Capnometry    Complicating Factors:  Obesity    Findings Post-Intubation:  BS equal bilateral and atraumatic/condition of teeth unchanged

## 2020-11-11 ENCOUNTER — TELEPHONE (OUTPATIENT)
Dept: NEUROLOGY | Facility: HOSPITAL | Age: 65
End: 2020-11-11

## 2020-11-11 NOTE — TELEPHONE ENCOUNTER
Talk to wife. Pt is feeling better after procedure. Eating better still a little weak but doing bertter

## 2020-11-12 ENCOUNTER — TELEPHONE (OUTPATIENT)
Dept: ENDOSCOPY | Facility: HOSPITAL | Age: 65
End: 2020-11-12

## 2020-11-13 ENCOUNTER — TELEPHONE (OUTPATIENT)
Dept: NEUROLOGY | Facility: HOSPITAL | Age: 65
End: 2020-11-13

## 2020-11-13 NOTE — TELEPHONE ENCOUNTER
----- Message from Tawanna Barba MA sent at 11/13/2020 12:56 PM CST -----  Regarding: requesting a call  Pt spouse nubia called requesting to speak with Nurse, has questions regarding upcoming surgery. She can be reach at [phone].

## 2020-11-13 NOTE — TELEPHONE ENCOUNTER
----- Message from Tawanna Barba MA sent at 11/13/2020 12:56 PM CST -----  Regarding: requesting a call  Pt spouse nubia called requesting to speak with Nurse, has questions regarding upcoming surgery. She can be reach at [phone].

## 2020-11-13 NOTE — TELEPHONE ENCOUNTER
Spoke with wife and she is asking about what comes next. Told her that I would get in contact with Dr. Angel and get back with her. She needs more supplies (drainage bags)

## 2020-11-13 NOTE — TELEPHONE ENCOUNTER
Spoke with Dr. Angel and he said that Dr. Ramon Penn is suppose to do the 2nd procedure 10 days from 1st procedure. Called wife and told her to reach our to Dr. Penn's staff to find out when and what time. She verbalized understanding

## 2020-11-16 ENCOUNTER — TELEPHONE (OUTPATIENT)
Dept: NEUROLOGY | Facility: HOSPITAL | Age: 65
End: 2020-11-16

## 2020-11-16 DIAGNOSIS — Z01.818 PRE-OP TESTING: ICD-10-CM

## 2020-11-16 NOTE — TELEPHONE ENCOUNTER
Spoke with wife and let her know that I call and left a message with Stephanie voice mail that the pt need drain bag

## 2020-11-16 NOTE — TELEPHONE ENCOUNTER
----- Message from Brian Stark sent at 11/16/2020  9:11 AM CST -----  Type:  Needs Medical Advice    Who Called: Asia /wife  Would the patient rather a call back or a response via [facility]? call  Best Call Back Number: [phone]  Additional Information: The contact at the home health agency is Stephanie / [facility] [phone]--he is on last drain bag.

## 2020-11-17 ENCOUNTER — TELEPHONE (OUTPATIENT)
Dept: ENDOSCOPY | Facility: HOSPITAL | Age: 65
End: 2020-11-17

## 2020-11-17 NOTE — TELEPHONE ENCOUNTER
----- Message from Aparna Mclean MA sent at 11/17/2020  8:58 AM CST -----  Contact: [phone]   Returning a call  to Marcela valentino: upcoming surgery

## 2020-11-18 ENCOUNTER — TELEPHONE (OUTPATIENT)
Dept: NEUROLOGY | Facility: HOSPITAL | Age: 65
End: 2020-11-18

## 2020-11-18 ENCOUNTER — TELEPHONE (OUTPATIENT)
Dept: ENDOSCOPY | Facility: HOSPITAL | Age: 65
End: 2020-11-18

## 2020-11-18 ENCOUNTER — DOCUMENT SCAN (OUTPATIENT)
Dept: HOME HEALTH SERVICES | Facility: HOSPITAL | Age: 65
End: 2020-11-18

## 2020-11-18 NOTE — TELEPHONE ENCOUNTER
Spoke to wife and she told me that she needed another drainage bag for the weekend. I called the [facility]. Spoke with Sasha, she said that she has a bag that she will drop off tomorrow and then they have ordered 2 more bags for next week if they are needed.

## 2020-11-18 NOTE — TELEPHONE ENCOUNTER
Received request to schedule patient for ERCP on 11/23/20 at 9:30am. Spoke with Patient's wife. Reviewed medical history and medications. Instructed on procedure and prep. Patient's wife verbalized understanding of instructions. E-mailed copy of instructions to address in chart.

## 2020-11-20 ENCOUNTER — LAB VISIT (OUTPATIENT)
Dept: URGENT CARE | Facility: CLINIC | Age: 65
End: 2020-11-20
Payer: MEDICARE

## 2020-11-20 DIAGNOSIS — Z01.818 PRE-OP TESTING: ICD-10-CM

## 2020-11-20 PROCEDURE — U0003 INFECTIOUS AGENT DETECTION BY NUCLEIC ACID (DNA OR RNA); SEVERE ACUTE RESPIRATORY SYNDROME CORONAVIRUS 2 (SARS-COV-2) (CORONAVIRUS DISEASE [COVID-19]), AMPLIFIED PROBE TECHNIQUE, MAKING USE OF HIGH THROUGHPUT TECHNOLOGIES AS DESCRIBED BY CMS-2020-01-R: HCPCS

## 2020-11-21 LAB — SARS-COV-2 RNA RESP QL NAA+PROBE: NOT DETECTED

## 2020-11-23 ENCOUNTER — ANESTHESIA (OUTPATIENT)
Dept: ENDOSCOPY | Facility: HOSPITAL | Age: 65
End: 2020-11-23
Payer: MEDICARE

## 2020-11-23 ENCOUNTER — HOSPITAL ENCOUNTER (OUTPATIENT)
Facility: HOSPITAL | Age: 65
Discharge: HOME | End: 2020-11-23
Attending: INTERNAL MEDICINE | Admitting: INTERNAL MEDICINE
Payer: MEDICARE

## 2020-11-23 ENCOUNTER — ANESTHESIA EVENT (OUTPATIENT)
Dept: ENDOSCOPY | Facility: HOSPITAL | Age: 65
End: 2020-11-23
Payer: MEDICARE

## 2020-11-23 VITALS
WEIGHT: 220 LBS | BODY MASS INDEX: 30.8 KG/M2 | HEART RATE: 76 BPM | DIASTOLIC BLOOD PRESSURE: 78 MMHG | RESPIRATION RATE: 20 BRPM | TEMPERATURE: 98 F | SYSTOLIC BLOOD PRESSURE: 162 MMHG | OXYGEN SATURATION: 100 % | HEIGHT: 71 IN

## 2020-11-23 DIAGNOSIS — K83.9 BILE LEAK: ICD-10-CM

## 2020-11-23 LAB
GLUCOSE SERPL-MCNC: 150 MG/DL (ref 70–110)
POCT GLUCOSE: 150 MG/DL (ref 70–110)
POCT GLUCOSE: 175 MG/DL (ref 70–110)

## 2020-11-23 PROCEDURE — 43274 ERCP DUCT STENT PLACEMENT: CPT | Mod: ,,, | Performed by: INTERNAL MEDICINE

## 2020-11-23 PROCEDURE — 82962 GLUCOSE BLOOD TEST: CPT | Performed by: INTERNAL MEDICINE

## 2020-11-23 PROCEDURE — 63600175 PHARM REV CODE 636 W HCPCS: Performed by: NURSE ANESTHETIST, CERTIFIED REGISTERED

## 2020-11-23 PROCEDURE — 43274 ERCP DUCT STENT PLACEMENT: CPT | Performed by: INTERNAL MEDICINE

## 2020-11-23 PROCEDURE — 43274 PR ERCP W/STENT PLCMNT BILIARY/PANCREATIC DUCT: ICD-10-PCS | Mod: 59,,, | Performed by: INTERNAL MEDICINE

## 2020-11-23 PROCEDURE — D9220A PRA ANESTHESIA: ICD-10-PCS | Mod: ANES,,, | Performed by: ANESTHESIOLOGY

## 2020-11-23 PROCEDURE — 74328 X-RAY BILE DUCT ENDOSCOPY: CPT | Performed by: INTERNAL MEDICINE

## 2020-11-23 PROCEDURE — 37000009 HC ANESTHESIA EA ADD 15 MINS: Performed by: INTERNAL MEDICINE

## 2020-11-23 PROCEDURE — C1726 CATH, BAL DIL, NON-VASCULAR: HCPCS | Performed by: INTERNAL MEDICINE

## 2020-11-23 PROCEDURE — D9220A PRA ANESTHESIA: Mod: CRNA,,, | Performed by: NURSE ANESTHETIST, CERTIFIED REGISTERED

## 2020-11-23 PROCEDURE — 25000003 PHARM REV CODE 250: Performed by: INTERNAL MEDICINE

## 2020-11-23 PROCEDURE — 74328 PR  X-RAY FOR BILE DUCT ENDOSCOPY: ICD-10-PCS | Mod: 26,,, | Performed by: INTERNAL MEDICINE

## 2020-11-23 PROCEDURE — D9220A PRA ANESTHESIA: Mod: ANES,,, | Performed by: ANESTHESIOLOGY

## 2020-11-23 PROCEDURE — 37000008 HC ANESTHESIA 1ST 15 MINUTES: Performed by: INTERNAL MEDICINE

## 2020-11-23 PROCEDURE — 27201674 HC SPHINCTERTOME: Performed by: INTERNAL MEDICINE

## 2020-11-23 PROCEDURE — 74328 X-RAY BILE DUCT ENDOSCOPY: CPT | Mod: 26,,, | Performed by: INTERNAL MEDICINE

## 2020-11-23 PROCEDURE — 27202125 HC BALLOON, EXTRACTION (ANY): Performed by: INTERNAL MEDICINE

## 2020-11-23 PROCEDURE — C2617 STENT, NON-COR, TEM W/O DEL: HCPCS | Performed by: INTERNAL MEDICINE

## 2020-11-23 PROCEDURE — C1769 GUIDE WIRE: HCPCS | Performed by: INTERNAL MEDICINE

## 2020-11-23 PROCEDURE — D9220A PRA ANESTHESIA: ICD-10-PCS | Mod: CRNA,,, | Performed by: NURSE ANESTHETIST, CERTIFIED REGISTERED

## 2020-11-23 RX ORDER — PROPOFOL 10 MG/ML
VIAL (ML) INTRAVENOUS CONTINUOUS PRN
Status: DISCONTINUED | OUTPATIENT
Start: 2020-11-23 | End: 2020-11-23

## 2020-11-23 RX ORDER — PHENYLEPHRINE HYDROCHLORIDE 10 MG/ML
INJECTION INTRAVENOUS
Status: DISCONTINUED | OUTPATIENT
Start: 2020-11-23 | End: 2020-11-23

## 2020-11-23 RX ORDER — INDOMETHACIN 50 MG/1
SUPPOSITORY RECTAL
Status: COMPLETED | OUTPATIENT
Start: 2020-11-23 | End: 2020-11-23

## 2020-11-23 RX ORDER — SODIUM CHLORIDE 0.9 % (FLUSH) 0.9 %
3 SYRINGE (ML) INJECTION
Status: DISCONTINUED | OUTPATIENT
Start: 2020-11-23 | End: 2020-11-23 | Stop reason: HOSPADM

## 2020-11-23 RX ORDER — SODIUM CHLORIDE 9 MG/ML
INJECTION, SOLUTION INTRAVENOUS CONTINUOUS
Status: DISCONTINUED | OUTPATIENT
Start: 2020-11-23 | End: 2020-11-23 | Stop reason: HOSPADM

## 2020-11-23 RX ORDER — LIDOCAINE HCL/PF 100 MG/5ML
SYRINGE (ML) INTRAVENOUS
Status: DISCONTINUED | OUTPATIENT
Start: 2020-11-23 | End: 2020-11-23

## 2020-11-23 RX ORDER — PROPOFOL 10 MG/ML
VIAL (ML) INTRAVENOUS
Status: DISCONTINUED | OUTPATIENT
Start: 2020-11-23 | End: 2020-11-23

## 2020-11-23 RX ORDER — SODIUM CHLORIDE 0.9 % (FLUSH) 0.9 %
10 SYRINGE (ML) INJECTION
Status: DISCONTINUED | OUTPATIENT
Start: 2020-11-23 | End: 2020-11-23 | Stop reason: HOSPADM

## 2020-11-23 RX ORDER — HYDROMORPHONE HYDROCHLORIDE 1 MG/ML
0.2 INJECTION, SOLUTION INTRAMUSCULAR; INTRAVENOUS; SUBCUTANEOUS EVERY 5 MIN PRN
Status: DISCONTINUED | OUTPATIENT
Start: 2020-11-23 | End: 2020-11-23 | Stop reason: HOSPADM

## 2020-11-23 RX ADMIN — SODIUM CHLORIDE: 0.9 INJECTION, SOLUTION INTRAVENOUS at 09:11

## 2020-11-23 RX ADMIN — INDOMETHACIN 100 MG: 50 SUPPOSITORY RECTAL at 10:11

## 2020-11-23 RX ADMIN — Medication 60 MG: at 10:11

## 2020-11-23 RX ADMIN — PHENYLEPHRINE HYDROCHLORIDE 100 MCG: 10 INJECTION INTRAVENOUS at 10:11

## 2020-11-23 RX ADMIN — PROPOFOL 150 MCG/KG/MIN: 10 INJECTION, EMULSION INTRAVENOUS at 10:11

## 2020-11-23 RX ADMIN — PROPOFOL 50 MG: 10 INJECTION, EMULSION INTRAVENOUS at 10:11

## 2020-11-23 NOTE — H&P
History & Physical - Short Stay  Gastroenterology      SUBJECTIVE:     Procedure: ERCP    Chief Complaint/Indication for Procedure: bile leak    History of Present Illness:  Patient is a 65 y.o. male with bile leak coming for ERCP post EDGE.     PTA Medications   Medication Sig    ascorbic acid, vitamin C, (VITAMIN C) 1000 MG tablet Take 1,000 mg by mouth once daily.    aspirin (ECOTRIN) 81 MG EC tablet Take 81 mg by mouth once daily.    atorvastatin (LIPITOR) 20 MG tablet Take 20 mg by mouth once daily.    CYANOCOBALAMIN, VITAMIN B-12, (VITAMIN B-12 ORAL) Take by mouth.    doxycycline (VIBRA-TABS) 100 MG tablet Take 1 tablet (100 mg total) by mouth every 12 (twelve) hours.    fenofibrate 160 MG Tab Take 160 mg by mouth once daily.    FERROUS SULFATE (IRON ORAL) Take by mouth.    FLORASTOR 250 mg capsule     FLUZONE HIGHDOSE QUAD 20-21  mcg/0.7 mL Syrg ADM 0.7ML IM UTD    FREESTYLE RUBY 14 DAY SENSOR Kit USE TO CHECK BLOOD GLUCOSE DAILY AND CHANGE SENSOR EVERY 14 DAYS    gabapentin (NEURONTIN) 300 MG capsule Take 300 mg by mouth 2 (two) times daily. 1 in the morning, 3 in the evening    GUAIFENESIN/DEXTROMETHORPHAN (MUCINEX DM ORAL) Take by mouth 2 (two) times daily.    ILEVRO 0.3 % DrpS SHAKE LQ AND INT 1 GTT IN OD QD    insulin aspart (NOVOLOG) 100 unit/mL injection Inject into the skin 3 (three) times daily before meals. 16/14/18 units    insulin glargine (LANTUS U-100 INSULIN) 100 unit/mL injection Inject 20 Units into the skin every evening.    linezolid (ZYVOX) 600 mg Tab     loratadine (CLARITIN) 10 mg tablet Take 10 mg by mouth daily as needed for Allergies.    LOTEMAX SM 0.38 % DrpG INT 1 GTT IN OD BID    magnesium oxide 400 mg Cap Take by mouth 2 (two) times daily.    metoprolol succinate (TOPROL-XL) 50 MG 24 hr tablet Take 25 mg by mouth once daily. 1/2 tablet in the morning, 1 in the evening     multivitamin capsule Take 1 capsule by mouth once daily.    potassium chloride  (MICRO-K) 10 MEQ CpSR Take 10 mEq by mouth once.    clopidogrel (PLAVIX) 75 mg tablet Take 75 mg by mouth once daily.    midodrine (PROAMATINE) 5 MG Tab Take 2.5 mg by mouth 2 (two) times daily with meals.    rOPINIRole (REQUIP) 1 MG tablet Take 1 mg by mouth every evening.       Review of patient's allergies indicates:  No Known Allergies     Past Medical History:   Diagnosis Date    Anticoagulant long-term use     Bile leak     Coronary artery disease 2004    stents x 3    CRI (chronic renal insufficiency)     Diabetes mellitus     Diaphragm paralysis     Elbow wound 12/01/2017    s/p fall; 3 stitches    Fall 12/01/2017    Fever     Glaucoma     Hearing deficit     Hypertension     Hyponatremia     Legally blind     Microcytic anemia     Restless leg     Vertigo      Past Surgical History:   Procedure Laterality Date    CORONARY STENT PLACEMENT  2004    ENDOSCOPIC ULTRASOUND OF UPPER GASTROINTESTINAL TRACT N/A 11/10/2020    Procedure: ULTRASOUND, UPPER GI TRACT, ENDOSCOPIC;  Surgeon: Ramon Douglas MD;  Location: [facility] ([address] [place] [address]);  Service: Endoscopy;  Laterality: N/A;  edge proc Tue. Discussed w/Dr Terry Douglas - Dr MARC Yang  Off Plavix >5 days - pg  Covid-19 test 11/7/20 at [facility] - pg    EYE SURGERY Bilateral     cataract and vitrectomy    foot surgery Bilateral     toe amputations    GASTRIC BYPASS  2011    JOINT REPLACEMENT      KNEE SURGERY Right     x 2    ROTATOR CUFF REPAIR Bilateral     TONSILLECTOMY       History reviewed. No pertinent family history.  Social History     Tobacco Use    Smoking status: Never Smoker    Smokeless tobacco: Never Used   Substance Use Topics    Alcohol use: Yes     Comment: rare    Drug use: Never          OBJECTIVE:     Vital Signs (Most Recent)  Temp: 97.9 °F (36.6 °C) (11/23/20 0908)  Pulse: 76 (11/23/20 0908)  Resp: 16 (11/23/20 0908)  BP: (!) 143/74 (11/23/20 0909)  SpO2: 100 % (11/23/20 0908)         ASSESSMENT/PLAN:      Patient is a 65 y.o. male with bile leak coming for ERCP post EDGE.     Plan: ERCP    Anesthesia Plan: General Sedation    ASA Grade: ASA 2 - Patient with mild systemic disease with no functional limitations

## 2020-11-23 NOTE — ANESTHESIA POSTPROCEDURE EVALUATION
Anesthesia Post Evaluation    Patient: Luca Camarena III    Procedure(s) Performed: Procedure(s) (LRB):  ERCP (ENDOSCOPIC RETROGRADE CHOLANGIOPANCREATOGRAPHY) (N/A)    Final Anesthesia Type: general    Patient location during evaluation: PACU  Patient participation: Yes- Able to Participate  Level of consciousness: awake and alert  Post-procedure vital signs: reviewed and stable  Pain management: adequate  Airway patency: patent    PONV status at discharge: No PONV  Anesthetic complications: no      Cardiovascular status: blood pressure returned to baseline and stable  Respiratory status: unassisted  Hydration status: euvolemic  Follow-up not needed.          Vitals Value Taken Time   /78 11/23/20 1230   Temp 36.6 °C (97.9 °F) 11/23/20 1230   Pulse 76 11/23/20 1230   Resp 20 11/23/20 1230   SpO2 100 % 11/23/20 1230         Event Time   Out of Recovery 11:31:00         Pain/Oscar Score: Oscar Score: 10 (11/23/2020 11:30 AM)

## 2020-11-23 NOTE — TRANSFER OF CARE
"Anesthesia Transfer of Care Note    Patient: Luca Camarena III    Procedure(s) Performed: Procedure(s) (LRB):  ERCP (ENDOSCOPIC RETROGRADE CHOLANGIOPANCREATOGRAPHY) (N/A)    Patient location: PACU    Anesthesia Type: general    Transport from OR: Transported from OR on room air with adequate spontaneous ventilation    Post pain: adequate analgesia    Post assessment: no apparent anesthetic complications and tolerated procedure well    Post vital signs: stable    Level of consciousness: sedated    Nausea/Vomiting: no nausea/vomiting    Complications: none    Transfer of care protocol was followed      Last vitals:   Visit Vitals  BP (!) 143/74   Pulse 76   Temp 36.6 °C (97.9 °F) (Temporal)   Resp 16   Ht 5' 11" (1.803 m)   Wt 99.8 kg (220 lb)   SpO2 100%   BMI 30.68 kg/m²     "

## 2020-11-23 NOTE — PLAN OF CARE
Discharge instructions reviewed with patient and spouse. Verbalizes understanding. Copies of instructions given to patient. Tolerating PO fluids. Denies pain or nausea.  Awaiting Dr. Terry Douglas to come to bedside to speak to patient and spouse.

## 2020-11-23 NOTE — [employer] PATIENT INSTRUCTIONS
Discharge Summary/Instructions after an Endoscopic Procedure  Patient Name: Luca Camarena  Patient MRN: [id number]  Patient YOB: 1955 Monday, November 23, 2020  Ramon Douglas MD  RESTRICTIONS:  During your procedure today, you received medications for sedation.  These   medications may affect your judgment, balance and coordination.  Therefore,   for 24 hours, you have the following restrictions:   - DO NOT drive a car, operate machinery, make legal/financial decisions,   sign important papers or drink alcohol.    ACTIVITY:  Today: no heavy lifting, straining or running due to procedural   sedation/anesthesia.  The following day: return to full activity including work.  DIET:  Eat and drink normally unless instructed otherwise.     TREATMENT FOR COMMON SIDE EFFECTS:  - Mild abdominal pain, nausea, belching, bloating or excessive gas:  rest,   eat lightly and use a heating pad.  - Sore Throat: treat with throat lozenges and/or gargle with warm salt   water.  - Because air was used during the procedure, expelling large amounts of air   from your rectum or belching is normal.  - If a bowel prep was taken, you may not have a bowel movement for 1-3 days.    This is normal.  SYMPTOMS TO WATCH FOR AND REPORT TO YOUR PHYSICIAN:  1. Abdominal pain or bloating, other than gas cramps.  2. Chest pain.  3. Back pain.  4. Signs of infection such as: chills or fever occurring within 24 hours   after the procedure.  5. Rectal bleeding, which would show as bright red, maroon, or black stools.   (A tablespoon of blood from the rectum is not serious, especially if   hemorrhoids are present.)  6. Vomiting.  7. Weakness or dizziness.  GO DIRECTLY TO THE NEAREST EMERGENCY ROOM IF YOU HAVE ANY OF THE FOLLOWING:      Difficulty breathing              Chills and/or fever over 101 F   Persistent vomiting and/or vomiting blood   Severe abdominal pain   Severe chest pain   Black, tarry stools   Bleeding- more than one  tablespoon   Any other symptom or condition that you feel may need urgent attention  Your doctor recommends these additional instructions:  If any biopsies were taken, your doctors clinic will contact you in 1 to 2   weeks with any results.  - Discharge patient to home.   - Resume previous diet.   - Continue present medications.   - Return to referring physician.   - Patient has a contact number available for emergencies.  The signs and   symptoms of potential delayed complications were discussed with the   patient.  Return to normal activities tomorrow.  Written discharge   instructions were provided to the patient.   - Repeat ERCP in 4-6 weeks to remove stent. Need to call patient and check   for bile leak resolution before scheduling.   - Abdominal X-ray in 2 weeks to confirm pancreas stent migration.   For questions, problems or results please call your physician - Ramon Douglas MD at Work:  [phone].  [facility], EMERGENCY ROOM PHONE NUMBER: [phone]  IF A COMPLICATION OR EMERGENCY SITUATION ARISES AND YOU ARE UNABLE TO REACH   YOUR PHYSICIAN - GO DIRECTLY TO THE EMERGENCY ROOM.  Ramon Douglas MD  11/23/2020 12:08:59 PM  This report has been verified and signed electronically.  [employer]

## 2020-11-23 NOTE — DISCHARGE INSTRUCTIONS
ERCP (Endoscopic Retrograde Cholangiopancreatography)     A balloon at the tip of a catheter opens above the stone. The stone is pulled out of the duct and leaves your body through stool.     ERCP stands for endoscopic retrograde cholangiopancreatography. This procedure is used to view the biliary and pancreatic ducts.  It is used to evaluate diseases that affect the ducts and to help locate and treat blockages that may be present.  How do I get ready for ERCP?  · Talk to your doctor about any health problems or allergies you have.  · Ask your doctor about the risks of ERCP. These include pancreatitis, infection, bleeding, and tearing the bowel.  · Be sure your doctor knows about all medicines you take. You may be told to stop taking some or all of them before the test. This includes:  · All prescription medicines  · Over-the-counter medicines that don't need a prescription  ·  Any street drugs you may use   · Herbs, vitamins, kelp, seaweed, cough syrups, and other supplements  · You may be asked to take antibiotics ahead of time.  · Avoid blood-thinning medicines for 1 week before ERCP.  · Do not eat or drink for 8 to 12 hours before ERCP.  · Have someone ready to take you home.  What happens during the procedure?  · You may be given medicine through an IV to help you relax.  · Your throat is numbed.  · A thin tube (endoscope) is placed into your throat. It is advanced from the throat through the upper digestive tract, to the common bile duct opening. The endoscope lets the doctor see the common bile and pancreatic ducts on a video screen.  · A cut may be made where the common bile duct opens to the duodenum to make it easier to remove stones.  · As blockages are located and removed, X-rays are taken.  · Contrast dye is injected through a catheter to make the duct show up better on the X-rays.  · An imaging technique that uses X-rays to obtain real-time moving images of internal organs is called fluoroscopy.  Fluoroscopy is used to watch and guide progress of the procedure.   · In some cases, a plastic tube (stent) is placed to hold the ducts open. This stent may be replaced or removed in 6 to 8 weeks. Or it may be left to fall out on its own and be passed in the stool.  What happens after ERCP?  Your doctor may discuss the test results right away or a return visit may be scheduled. You may go home the same day or spend the night in the hospital. Follow these tips:  · You can return to a normal routine the day after the ERCP.  · If a cut was made in the duct, avoid blood-thinning medicines such as aspirin for 5 to 7 days.  · Call your doctor right away if you have a fever or abdominal pain. These may be signs of an infection or torn bowel.   Date Last Reviewed: 6/19/2015  © 7110-3790 [employer]. [address] [place] [address], [place] [address]. All rights reserved. This information is not intended as a substitute for professional medical care. Always follow your healthcare professional's instructions.

## 2020-11-23 NOTE — ANESTHESIA PREPROCEDURE EVALUATION
11/23/2020  Luca Camarena III is a 65 y.o., male.    Anesthesia Evaluation    I have reviewed the Patient Summary Reports.         Review of Systems  Anesthesia Hx:  No problems with previous Anesthesia    Social:  Non-Smoker    Hematology/Oncology:  Hematology Normal   Oncology Normal     EENT/Dental:EENT/Dental Normal   Cardiovascular:   Hypertension CAD  CABG/stent     Pulmonary:  Pulmonary Normal    Renal/:   Chronic Renal Disease, CRI    Hepatic/GI:  Hepatic/GI Normal    Musculoskeletal:  Musculoskeletal Normal    Neurological:  Neurology Normal    Endocrine:   Diabetes, type 2    Dermatological:  Skin Normal    Psych:  Psychiatric Normal           Physical Exam  General:  Well nourished    Airway/Jaw/Neck:  Airway Findings: Mouth Opening: Normal Tongue: Normal  General Airway Assessment: Adult  Mallampati: II  Improves to II with phonation.  TM Distance: Normal, at least 6 cm  Jaw/Neck Findings:  Neck ROM: Normal ROM      Dental:  Dental Findings: In tact   Chest/Lungs:  Chest/Lungs Findings: Clear to auscultation, Normal Respiratory Rate     Heart/Vascular:  Heart Findings: Rate: Normal  Rhythm: Regular Rhythm  Sounds: Normal             Anesthesia Plan  Type of Anesthesia, risks & benefits discussed:  Anesthesia Type:  general  Patient's Preference: General  Intra-op Monitoring Plan:   Intra-op Monitoring Plan Comments:   Post Op Pain Control Plan:   Post Op Pain Control Plan Comments:   Induction:   IV  Beta Blocker:  Patient is not currently on a Beta-Blocker (No further documentation required).       Informed Consent: Patient understands risks and agrees with Anesthesia plan.  Questions answered. Anesthesia consent signed with patient.  ASA Score: 3     Day of Surgery Review of History & Physical: I have interviewed and examined the patient. I have reviewed the patient's H&P dated:  There are no  significant changes.          Ready For Surgery From Anesthesia Perspective.

## 2020-11-24 ENCOUNTER — TELEPHONE (OUTPATIENT)
Dept: NEUROLOGY | Facility: HOSPITAL | Age: 65
End: 2020-11-24

## 2020-11-24 ENCOUNTER — TELEPHONE (OUTPATIENT)
Dept: ENDOSCOPY | Facility: HOSPITAL | Age: 65
End: 2020-11-24

## 2020-11-24 DIAGNOSIS — K83.1 BILIARY STRICTURE: Primary | ICD-10-CM

## 2020-11-24 NOTE — TELEPHONE ENCOUNTER
Spoke with wife and explained to her why the appt was made for her  for tomorrow. Told her to call me in the am and tell me how much if any drainage from the drain there is. She verbalized understanding

## 2020-11-24 NOTE — TELEPHONE ENCOUNTER
Called wife and LVM. Ask for a return call about if drainage out of tube. If no drainage then need appt. If no drainage we need the appt to pull drain.

## 2020-11-24 NOTE — TELEPHONE ENCOUNTER
----- Message from Shima Cronin sent at 11/24/2020  4:16 PM CST -----  Contact: wife [phone]  YELENA - Patient is calling to speak with you about her  drain. Please call

## 2020-11-25 ENCOUNTER — OFFICE VISIT (OUTPATIENT)
Dept: NEUROLOGY | Facility: HOSPITAL | Age: 65
End: 2020-11-25
Attending: SURGERY
Payer: MEDICARE

## 2020-11-25 VITALS
DIASTOLIC BLOOD PRESSURE: 65 MMHG | WEIGHT: 209.56 LBS | TEMPERATURE: 98 F | HEART RATE: 82 BPM | HEIGHT: 71 IN | BODY MASS INDEX: 29.34 KG/M2 | SYSTOLIC BLOOD PRESSURE: 129 MMHG

## 2020-11-25 DIAGNOSIS — Z09 POSTOP CHECK: Primary | ICD-10-CM

## 2020-11-25 PROCEDURE — 99215 OFFICE O/P EST HI 40 MIN: CPT | Performed by: SURGERY

## 2020-11-25 RX ORDER — PANTOPRAZOLE SODIUM 40 MG/1
TABLET, DELAYED RELEASE ORAL
COMMUNITY
Start: 2020-11-05

## 2020-11-25 RX ORDER — TAMSULOSIN HYDROCHLORIDE 0.4 MG/1
CAPSULE ORAL
COMMUNITY
Start: 2020-11-05

## 2020-11-25 NOTE — PROGRESS NOTES
" NOLAN[facility]:  [facility]  A collaboration between [facility] and [facility]        Chief Complaint:  post op follow up    S/p:   11/23/2020 - Ercp (endoscopic Retrograde Cholangiopancreatography)     Pathology:     Vital Sign:   Vitals:    11/25/20 1317   BP: 129/65   Pulse: 82   Temp: 98.3 °F (36.8 °C)   TempSrc: Oral   Weight: 95.1 kg (209 lb 8.8 oz)   Height: 5' 11" (1.803 m)       Incision: healing well    Appetite: good    Restrictions: NO Restrictions    Return to clinic: 1 week                  "

## 2020-11-27 NOTE — PROGRESS NOTES
[facility]:  [facility]  A collaboration between [facility] and [facility]        Chief Complaint:  post op follow up    S/p:   11/23/2020 - Ercp (endoscopic Retrograde Cholangiopancreatography)  Drain pull appointment    Pathology: n/a    Vital Sign:   Vitals:    11/30/20 0855   BP: 136/73   Pulse: 84   Temp: 96.4 °F (35.8 °C)   TempSrc: Oral   Weight: 96.1 kg (211 lb 13.8 oz)        Incision: healing well    Appetite: increased    Restrictions: NO Restrictions    Return to clinic: 3 months     Plan for ERCp in 30 4 weeks    Focus on nutrition      F/u PCP for DM

## 2020-11-30 ENCOUNTER — OFFICE VISIT (OUTPATIENT)
Dept: NEUROLOGY | Facility: HOSPITAL | Age: 65
End: 2020-11-30
Attending: SURGERY
Payer: MEDICARE

## 2020-11-30 VITALS
SYSTOLIC BLOOD PRESSURE: 136 MMHG | WEIGHT: 211.88 LBS | BODY MASS INDEX: 29.55 KG/M2 | DIASTOLIC BLOOD PRESSURE: 73 MMHG | HEART RATE: 84 BPM | TEMPERATURE: 96 F

## 2020-11-30 DIAGNOSIS — Z09 POSTOP CHECK: Primary | ICD-10-CM

## 2020-11-30 PROCEDURE — 99215 OFFICE O/P EST HI 40 MIN: CPT | Performed by: SURGERY

## 2020-12-07 ENCOUNTER — HOSPITAL ENCOUNTER (OUTPATIENT)
Dept: RADIOLOGY | Facility: HOSPITAL | Age: 65
Discharge: HOME | End: 2020-12-07
Attending: INTERNAL MEDICINE
Payer: MEDICARE

## 2020-12-07 DIAGNOSIS — K83.1 BILIARY STRICTURE: ICD-10-CM

## 2020-12-07 PROCEDURE — 74019 RADEX ABDOMEN 2 VIEWS: CPT | Mod: 26,,, | Performed by: RADIOLOGY

## 2020-12-07 PROCEDURE — 74019 XR ABDOMEN FLAT AND ERECT: ICD-10-PCS | Mod: 26,,, | Performed by: RADIOLOGY

## 2020-12-07 PROCEDURE — 74019 RADEX ABDOMEN 2 VIEWS: CPT | Mod: TC,FY

## 2020-12-14 ENCOUNTER — TELEPHONE (OUTPATIENT)
Dept: ENDOSCOPY | Facility: HOSPITAL | Age: 65
End: 2020-12-14

## 2020-12-14 NOTE — TELEPHONE ENCOUNTER
----- Message from Ramon Douglas MD sent at 12/14/2020 11:33 AM CST -----  Ok. Lets wait  ----- Message -----  From: Marcela Adair MA  Sent: 12/14/2020  10:18 AM CST  To: Ramon Douglas MD    12/29  ----- Message -----  From: Ramon Douglas MD  Sent: 12/14/2020   9:30 AM CST  To: Marcela Adair MA    When is the ercp scheduled ?  ----- Message -----  From: Marcela Adair MA  Sent: 12/11/2020   8:35 AM CST  To: Ramon Douglas MD    He is coming back in a couple of weeks for an ERCP. Can this wait until then?  ----- Message -----  From: Ramon Douglas MD  Sent: 12/10/2020   8:41 PM CST  To: Marcela Adair MA    egd for pd stent removal

## 2020-12-17 ENCOUNTER — DOCUMENT SCAN (OUTPATIENT)
Dept: HOME HEALTH SERVICES | Facility: HOSPITAL | Age: 65
End: 2020-12-17

## 2020-12-18 ENCOUNTER — TELEPHONE (OUTPATIENT)
Dept: ENDOSCOPY | Facility: HOSPITAL | Age: 65
End: 2020-12-18

## 2020-12-18 DIAGNOSIS — K83.9 BILE LEAK: ICD-10-CM

## 2020-12-18 NOTE — TELEPHONE ENCOUNTER
Spoke with patient's wife about instructions for Covid-19 test 12/26/20 at 1120 at [facility] IM/PCW and ERCP 12/29/20 at 0800.  Instructions emailed.  Patient will hold Plavix for 5 days prior to procedure with permission from Dr Meir Calzada (scanned to media tab 11/17/20).

## 2020-12-26 ENCOUNTER — LAB VISIT (OUTPATIENT)
Dept: INTERNAL MEDICINE | Facility: CLINIC | Age: 65
End: 2020-12-26
Payer: MEDICARE

## 2020-12-26 DIAGNOSIS — K83.9 BILE LEAK: ICD-10-CM

## 2020-12-26 LAB — SARS-COV-2 RNA RESP QL NAA+PROBE: NOT DETECTED

## 2020-12-26 PROCEDURE — U0003 INFECTIOUS AGENT DETECTION BY NUCLEIC ACID (DNA OR RNA); SEVERE ACUTE RESPIRATORY SYNDROME CORONAVIRUS 2 (SARS-COV-2) (CORONAVIRUS DISEASE [COVID-19]), AMPLIFIED PROBE TECHNIQUE, MAKING USE OF HIGH THROUGHPUT TECHNOLOGIES AS DESCRIBED BY CMS-2020-01-R: HCPCS

## 2020-12-29 ENCOUNTER — HOSPITAL ENCOUNTER (OUTPATIENT)
Facility: HOSPITAL | Age: 65
Discharge: HOME | End: 2020-12-29
Attending: INTERNAL MEDICINE | Admitting: INTERNAL MEDICINE
Payer: MEDICARE

## 2020-12-29 ENCOUNTER — ANESTHESIA (OUTPATIENT)
Dept: ENDOSCOPY | Facility: HOSPITAL | Age: 65
End: 2020-12-29
Payer: MEDICARE

## 2020-12-29 ENCOUNTER — ANESTHESIA EVENT (OUTPATIENT)
Dept: ENDOSCOPY | Facility: HOSPITAL | Age: 65
End: 2020-12-29
Payer: MEDICARE

## 2020-12-29 VITALS
BODY MASS INDEX: 30.1 KG/M2 | SYSTOLIC BLOOD PRESSURE: 162 MMHG | HEART RATE: 72 BPM | TEMPERATURE: 98 F | HEIGHT: 71 IN | OXYGEN SATURATION: 100 % | DIASTOLIC BLOOD PRESSURE: 77 MMHG | WEIGHT: 215 LBS | RESPIRATION RATE: 15 BRPM

## 2020-12-29 DIAGNOSIS — K91.89 BILE LEAK, POSTOPERATIVE: ICD-10-CM

## 2020-12-29 DIAGNOSIS — K83.8 BILE LEAK, POSTOPERATIVE: ICD-10-CM

## 2020-12-29 LAB
POCT GLUCOSE: 108 MG/DL (ref 70–110)
POCT GLUCOSE: 59 MG/DL (ref 70–110)

## 2020-12-29 PROCEDURE — 43275 PR ERCP W/REMOVAL FOREIGN BODY/STENT FROM BILIARY/PANCREATIC DUCT: ICD-10-PCS | Mod: ,,, | Performed by: INTERNAL MEDICINE

## 2020-12-29 PROCEDURE — 43264 ERCP REMOVE DUCT CALCULI: CPT | Performed by: INTERNAL MEDICINE

## 2020-12-29 PROCEDURE — D9220A PRA ANESTHESIA: ICD-10-PCS | Mod: ANES,,, | Performed by: ANESTHESIOLOGY

## 2020-12-29 PROCEDURE — 27201691: Performed by: INTERNAL MEDICINE

## 2020-12-29 PROCEDURE — 74328 PR  X-RAY FOR BILE DUCT ENDOSCOPY: ICD-10-PCS | Mod: 26,,, | Performed by: INTERNAL MEDICINE

## 2020-12-29 PROCEDURE — 37000009 HC ANESTHESIA EA ADD 15 MINS: Performed by: INTERNAL MEDICINE

## 2020-12-29 PROCEDURE — 25500020 PHARM REV CODE 255: Performed by: INTERNAL MEDICINE

## 2020-12-29 PROCEDURE — 37000008 HC ANESTHESIA 1ST 15 MINUTES: Performed by: INTERNAL MEDICINE

## 2020-12-29 PROCEDURE — 74328 X-RAY BILE DUCT ENDOSCOPY: CPT | Mod: 26,,, | Performed by: INTERNAL MEDICINE

## 2020-12-29 PROCEDURE — 25000003 PHARM REV CODE 250: Performed by: ANESTHESIOLOGY

## 2020-12-29 PROCEDURE — D9220A PRA ANESTHESIA: Mod: ANES,,, | Performed by: ANESTHESIOLOGY

## 2020-12-29 PROCEDURE — 27202125 HC BALLOON, EXTRACTION (ANY): Performed by: INTERNAL MEDICINE

## 2020-12-29 PROCEDURE — 43264 PR ERCP,W/REMOVAL STONE,BIL/PANCR DUCTS: ICD-10-PCS | Mod: 51,,, | Performed by: INTERNAL MEDICINE

## 2020-12-29 PROCEDURE — D9220A PRA ANESTHESIA: Mod: CRNA,,, | Performed by: NURSE ANESTHETIST, CERTIFIED REGISTERED

## 2020-12-29 PROCEDURE — 43275 ERCP REMOVE FORGN BODY DUCT: CPT | Performed by: INTERNAL MEDICINE

## 2020-12-29 PROCEDURE — 27201692: Performed by: INTERNAL MEDICINE

## 2020-12-29 PROCEDURE — 27201089 HC SNARE, DISP (ANY): Performed by: INTERNAL MEDICINE

## 2020-12-29 PROCEDURE — 27202087 HC PROBE, APC: Performed by: INTERNAL MEDICINE

## 2020-12-29 PROCEDURE — 27201690: Performed by: INTERNAL MEDICINE

## 2020-12-29 PROCEDURE — D9220A PRA ANESTHESIA: ICD-10-PCS | Mod: CRNA,,, | Performed by: NURSE ANESTHETIST, CERTIFIED REGISTERED

## 2020-12-29 PROCEDURE — 43275 ERCP REMOVE FORGN BODY DUCT: CPT | Mod: ,,, | Performed by: INTERNAL MEDICINE

## 2020-12-29 PROCEDURE — 82962 GLUCOSE BLOOD TEST: CPT | Mod: 91 | Performed by: INTERNAL MEDICINE

## 2020-12-29 PROCEDURE — 94761 N-INVAS EAR/PLS OXIMETRY MLT: CPT

## 2020-12-29 PROCEDURE — 27201014 HC GRASPER DEVICE: Performed by: INTERNAL MEDICINE

## 2020-12-29 PROCEDURE — 74328 X-RAY BILE DUCT ENDOSCOPY: CPT | Performed by: INTERNAL MEDICINE

## 2020-12-29 PROCEDURE — 25000003 PHARM REV CODE 250: Performed by: INTERNAL MEDICINE

## 2020-12-29 PROCEDURE — 82962 GLUCOSE BLOOD TEST: CPT | Performed by: INTERNAL MEDICINE

## 2020-12-29 PROCEDURE — 25000003 PHARM REV CODE 250: Performed by: NURSE ANESTHETIST, CERTIFIED REGISTERED

## 2020-12-29 PROCEDURE — 63600175 PHARM REV CODE 636 W HCPCS: Performed by: NURSE ANESTHETIST, CERTIFIED REGISTERED

## 2020-12-29 PROCEDURE — C1769 GUIDE WIRE: HCPCS | Performed by: INTERNAL MEDICINE

## 2020-12-29 PROCEDURE — 43264 ERCP REMOVE DUCT CALCULI: CPT | Mod: 51,,, | Performed by: INTERNAL MEDICINE

## 2020-12-29 RX ORDER — LABETALOL HCL 20 MG/4 ML
5 SYRINGE (ML) INTRAVENOUS ONCE
Status: COMPLETED | OUTPATIENT
Start: 2020-12-29 | End: 2020-12-29

## 2020-12-29 RX ORDER — PROPOFOL 10 MG/ML
VIAL (ML) INTRAVENOUS
Status: DISCONTINUED | OUTPATIENT
Start: 2020-12-29 | End: 2020-12-29

## 2020-12-29 RX ORDER — MIDAZOLAM HYDROCHLORIDE 1 MG/ML
INJECTION INTRAMUSCULAR; INTRAVENOUS
Status: DISCONTINUED | OUTPATIENT
Start: 2020-12-29 | End: 2020-12-29

## 2020-12-29 RX ORDER — SODIUM CHLORIDE 9 MG/ML
INJECTION, SOLUTION INTRAVENOUS CONTINUOUS PRN
Status: DISCONTINUED | OUTPATIENT
Start: 2020-12-29 | End: 2020-12-29

## 2020-12-29 RX ORDER — GLUCAGON 1 MG
1 KIT INJECTION
Status: DISCONTINUED | OUTPATIENT
Start: 2020-12-29 | End: 2020-12-29 | Stop reason: HOSPADM

## 2020-12-29 RX ORDER — LIDOCAINE HCL/PF 100 MG/5ML
SYRINGE (ML) INTRAVENOUS
Status: DISCONTINUED | OUTPATIENT
Start: 2020-12-29 | End: 2020-12-29

## 2020-12-29 RX ORDER — IBUPROFEN 200 MG
16 TABLET ORAL
Status: DISCONTINUED | OUTPATIENT
Start: 2020-12-29 | End: 2020-12-29 | Stop reason: HOSPADM

## 2020-12-29 RX ORDER — PHENYLEPHRINE HCL IN 0.9% NACL 1 MG/10 ML
SYRINGE (ML) INTRAVENOUS
Status: DISCONTINUED | OUTPATIENT
Start: 2020-12-29 | End: 2020-12-29

## 2020-12-29 RX ORDER — PROPOFOL 10 MG/ML
VIAL (ML) INTRAVENOUS CONTINUOUS PRN
Status: DISCONTINUED | OUTPATIENT
Start: 2020-12-29 | End: 2020-12-29

## 2020-12-29 RX ORDER — SODIUM CHLORIDE 9 MG/ML
INJECTION, SOLUTION INTRAVENOUS CONTINUOUS
Status: DISCONTINUED | OUTPATIENT
Start: 2020-12-29 | End: 2020-12-29 | Stop reason: HOSPADM

## 2020-12-29 RX ORDER — IBUPROFEN 200 MG
24 TABLET ORAL
Status: DISCONTINUED | OUTPATIENT
Start: 2020-12-29 | End: 2020-12-29 | Stop reason: HOSPADM

## 2020-12-29 RX ADMIN — PROPOFOL 10 MG: 10 INJECTION, EMULSION INTRAVENOUS at 08:12

## 2020-12-29 RX ADMIN — SODIUM CHLORIDE: 0.9 INJECTION, SOLUTION INTRAVENOUS at 08:12

## 2020-12-29 RX ADMIN — PROPOFOL 50 MG: 10 INJECTION, EMULSION INTRAVENOUS at 08:12

## 2020-12-29 RX ADMIN — Medication 5 MG: at 11:12

## 2020-12-29 RX ADMIN — Medication 100 MCG: at 09:12

## 2020-12-29 RX ADMIN — PROPOFOL 150 MCG/KG/MIN: 10 INJECTION, EMULSION INTRAVENOUS at 08:12

## 2020-12-29 RX ADMIN — MIDAZOLAM HYDROCHLORIDE 1 MG: 1 INJECTION, SOLUTION INTRAMUSCULAR; INTRAVENOUS at 08:12

## 2020-12-29 RX ADMIN — SODIUM CHLORIDE 10 ML/HR: 0.9 INJECTION, SOLUTION INTRAVENOUS at 07:12

## 2020-12-29 RX ADMIN — IOHEXOL 10 ML: 300 INJECTION, SOLUTION INTRAVENOUS at 08:12

## 2020-12-29 RX ADMIN — DEXTROSE MONOHYDRATE 12.5 G: 25 INJECTION, SOLUTION INTRAVENOUS at 09:12

## 2020-12-29 RX ADMIN — Medication 80 MG: at 08:12

## 2020-12-29 NOTE — TRANSFER OF CARE
"Anesthesia Transfer of Care Note    Patient: Lcua Camarena III    Procedure(s) Performed: Procedure(s) (LRB):  ERCP (ENDOSCOPIC RETROGRADE CHOLANGIOPANCREATOGRAPHY) (N/A)    Patient location: PACU    Anesthesia Type: general    Transport from OR: Transported from OR on 2-3 L/min O2 by NC with adequate spontaneous ventilation    Post pain: adequate analgesia    Post assessment: no apparent anesthetic complications and tolerated procedure well    Post vital signs: stable    Level of consciousness: sedated    Nausea/Vomiting: no nausea/vomiting    Complications: none    Transfer of care protocol was followed      Last vitals:   Visit Vitals  BP (!) 96/55 (BP Location: Right arm, Patient Position: Lying)   Pulse 72   Temp 36.5 °C (97.7 °F) (Temporal)   Resp 18   Ht 5' 11" (1.803 m)   Wt 97.5 kg (215 lb)   SpO2 97%   BMI 29.99 kg/m²     "

## 2020-12-29 NOTE — ANESTHESIA RELEASE NOTE
"Anesthesia Release from PACU Note    Patient: Luca Camarena III    Procedure(s) Performed: Procedure(s) (LRB):  ERCP (ENDOSCOPIC RETROGRADE CHOLANGIOPANCREATOGRAPHY) (N/A)    Anesthesia type: general    Post pain: Adequate analgesia    Post assessment: no apparent anesthetic complications and tolerated procedure well    Last Vitals:   Visit Vitals  BP (!) 164/78 (BP Location: Left arm, Patient Position: Lying)   Pulse 71   Temp 36.4 °C (97.6 °F)   Resp 12   Ht 5' 11" (1.803 m)   Wt 97.5 kg (215 lb)   SpO2 98%   BMI 29.99 kg/m²       Post vital signs: stable    Level of consciousness: awake, alert  and oriented    Nausea/Vomiting: no nausea/no vomiting    Complications: none    Airway Patency: patent    Respiratory: unassisted, spontaneous ventilation, room air    Cardiovascular: stable and blood pressure at baseline    Hydration: euvolemic  "

## 2020-12-29 NOTE — ANESTHESIA POSTPROCEDURE EVALUATION
Anesthesia Post Evaluation    Patient: Luca Camarena III    Procedure(s) Performed: Procedure(s) (LRB):  ERCP (ENDOSCOPIC RETROGRADE CHOLANGIOPANCREATOGRAPHY) (N/A)    Final Anesthesia Type: general      Patient participation: Yes- Able to Participate  Level of consciousness: awake and alert  Post-procedure vital signs: reviewed and stable  Pain management: adequate  Airway patency: patent    PONV status at discharge: No PONV  Anesthetic complications: no      Cardiovascular status: blood pressure returned to baseline and stable  Respiratory status: unassisted, room air and spontaneous ventilation  Hydration status: euvolemic  Follow-up not needed.          Vitals Value Taken Time   /79 12/29/20 1201   Temp 36.4 °C (97.6 °F) 12/29/20 1156   Pulse 69 12/29/20 1212   Resp 12 12/29/20 1156   SpO2 100 % 12/29/20 1212   Vitals shown include unvalidated device data.      Event Time   Out of Recovery 11:56:00         Pain/Oscar Score: Oscar Score: 9 (12/29/2020 11:58 AM)

## 2020-12-29 NOTE — ANESTHESIA PREPROCEDURE EVALUATION
12/29/2020  Luca Camarena III is a 65 y.o., male.    Pre-op Assessment    I have reviewed the Patient Summary Reports.          Review of Systems  Anesthesia Hx:  No problems with previous Anesthesia    Social:  Non-Smoker    Hematology/Oncology:  Hematology Normal   Oncology Normal     EENT/Dental:EENT/Dental Normal   Cardiovascular:   Hypertension CAD  CABG/stent     Pulmonary:  Pulmonary Normal    Renal/:   Chronic Renal Disease, CRI    Hepatic/GI:  Hepatic/GI Normal    Musculoskeletal:  Musculoskeletal Normal    Neurological:  Neurology Normal    Endocrine:   Diabetes, type 2    Dermatological:  Skin Normal    Psych:  Psychiatric Normal           Physical Exam  General:  Well nourished    Airway/Jaw/Neck:  Airway Findings: Mouth Opening: Normal Tongue: Normal  General Airway Assessment: Adult  Mallampati: II  Improves to II with phonation.  TM Distance: Normal, at least 6 cm  Jaw/Neck Findings:  Neck ROM: Normal ROM      Dental:  Dental Findings: In tact   Chest/Lungs:  Chest/Lungs Findings: Clear to auscultation, Normal Respiratory Rate     Heart/Vascular:  Heart Findings: Rate: Normal  Rhythm: Regular Rhythm  Sounds: Normal             Anesthesia Plan  Type of Anesthesia, risks & benefits discussed:  Anesthesia Type:  general  Patient's Preference: General  Intra-op Monitoring Plan:   Intra-op Monitoring Plan Comments:   Post Op Pain Control Plan:   Post Op Pain Control Plan Comments:   Induction:   IV  Beta Blocker:  Patient is not currently on a Beta-Blocker (No further documentation required).       Informed Consent: Patient understands risks and agrees with Anesthesia plan.  Questions answered. Anesthesia consent signed with patient.  ASA Score: 3     Day of Surgery Review of History & Physical: I have interviewed and examined the patient. I have reviewed the patient's H&P dated:  There are no  significant changes.          Ready For Surgery From Anesthesia Perspective.

## 2020-12-30 LAB — POCT GLUCOSE: 84 MG/DL (ref 70–110)

## 2021-01-08 ENCOUNTER — OFFICE VISIT (OUTPATIENT)
Dept: URGENT CARE | Facility: CLINIC | Age: 66
End: 2021-01-08
Payer: MEDICARE

## 2021-01-08 VITALS
HEIGHT: 71 IN | TEMPERATURE: 98 F | WEIGHT: 215 LBS | SYSTOLIC BLOOD PRESSURE: 130 MMHG | DIASTOLIC BLOOD PRESSURE: 64 MMHG | RESPIRATION RATE: 18 BRPM | HEART RATE: 83 BPM | OXYGEN SATURATION: 98 % | BODY MASS INDEX: 30.1 KG/M2

## 2021-01-08 DIAGNOSIS — B96.89 ACUTE BACTERIAL SINUSITIS: Primary | ICD-10-CM

## 2021-01-08 DIAGNOSIS — J01.90 ACUTE BACTERIAL SINUSITIS: Primary | ICD-10-CM

## 2021-01-08 DIAGNOSIS — R50.9 FEVER, UNSPECIFIED FEVER CAUSE: ICD-10-CM

## 2021-01-08 LAB
BILIRUB UR QL STRIP: NEGATIVE
CTP QC/QA: YES
CTP QC/QA: YES
GLUCOSE UR QL STRIP: NEGATIVE
KETONES UR QL STRIP: NEGATIVE
LEUKOCYTE ESTERASE UR QL STRIP: POSITIVE
PH, POC UA: 5.5 (ref 5–8)
POC BLOOD, URINE: NEGATIVE
POC MOLECULAR INFLUENZA A AGN: NEGATIVE
POC MOLECULAR INFLUENZA B AGN: NEGATIVE
POC NITRATES, URINE: NEGATIVE
PROT UR QL STRIP: POSITIVE
SARS-COV-2 RDRP RESP QL NAA+PROBE: NEGATIVE
SP GR UR STRIP: 1.03 (ref 1–1.03)
UROBILINOGEN UR STRIP-ACNC: NORMAL (ref 0.3–2.2)

## 2021-01-08 PROCEDURE — 71046 X-RAY EXAM CHEST 2 VIEWS: CPT | Mod: FY,S$GLB,, | Performed by: RADIOLOGY

## 2021-01-08 PROCEDURE — 99214 PR OFFICE/OUTPT VISIT, EST, LEVL IV, 30-39 MIN: ICD-10-PCS | Mod: 25,S$GLB,, | Performed by: PHYSICIAN ASSISTANT

## 2021-01-08 PROCEDURE — U0002: ICD-10-PCS | Mod: QW,CR,S$GLB, | Performed by: PHYSICIAN ASSISTANT

## 2021-01-08 PROCEDURE — 87502 INFLUENZA DNA AMP PROBE: CPT | Mod: QW,S$GLB,, | Performed by: PHYSICIAN ASSISTANT

## 2021-01-08 PROCEDURE — 81003 POCT URINALYSIS, DIPSTICK, AUTOMATED, W/O SCOPE: ICD-10-PCS | Mod: QW,S$GLB,, | Performed by: PHYSICIAN ASSISTANT

## 2021-01-08 PROCEDURE — U0002 COVID-19 LAB TEST NON-CDC: HCPCS | Mod: QW,CR,S$GLB, | Performed by: PHYSICIAN ASSISTANT

## 2021-01-08 PROCEDURE — 99214 OFFICE O/P EST MOD 30 MIN: CPT | Mod: 25,S$GLB,, | Performed by: PHYSICIAN ASSISTANT

## 2021-01-08 PROCEDURE — 81003 URINALYSIS AUTO W/O SCOPE: CPT | Mod: QW,S$GLB,, | Performed by: PHYSICIAN ASSISTANT

## 2021-01-08 PROCEDURE — 71046 XR CHEST PA AND LATERAL: ICD-10-PCS | Mod: FY,S$GLB,, | Performed by: RADIOLOGY

## 2021-01-08 PROCEDURE — 87086 URINE CULTURE/COLONY COUNT: CPT

## 2021-01-08 PROCEDURE — 87502 POCT INFLUENZA A/B MOLECULAR: ICD-10-PCS | Mod: QW,S$GLB,, | Performed by: PHYSICIAN ASSISTANT

## 2021-01-08 RX ORDER — FLUTICASONE PROPIONATE 50 MCG
1 SPRAY, SUSPENSION (ML) NASAL DAILY
Qty: 18.2 ML | Refills: 0 | Status: SHIPPED | OUTPATIENT
Start: 2021-01-08

## 2021-01-08 RX ORDER — AMOXICILLIN AND CLAVULANATE POTASSIUM 875; 125 MG/1; MG/1
1 TABLET, FILM COATED ORAL 2 TIMES DAILY
Qty: 14 TABLET | Refills: 0 | Status: SHIPPED | OUTPATIENT
Start: 2021-01-08 | End: 2021-01-15

## 2021-01-09 LAB
BACTERIA UR CULT: NORMAL
BACTERIA UR CULT: NORMAL

## 2021-02-19 ENCOUNTER — TELEPHONE (OUTPATIENT)
Dept: NEUROLOGY | Facility: HOSPITAL | Age: 66
End: 2021-02-19

## 2021-02-22 ENCOUNTER — OFFICE VISIT (OUTPATIENT)
Dept: NEUROLOGY | Facility: HOSPITAL | Age: 66
End: 2021-02-22
Attending: SURGERY
Payer: MEDICARE

## 2021-02-22 VITALS
HEIGHT: 71 IN | BODY MASS INDEX: 30.51 KG/M2 | DIASTOLIC BLOOD PRESSURE: 65 MMHG | HEART RATE: 77 BPM | RESPIRATION RATE: 17 BRPM | SYSTOLIC BLOOD PRESSURE: 129 MMHG | WEIGHT: 217.94 LBS | TEMPERATURE: 97 F

## 2021-02-22 DIAGNOSIS — Z90.49 S/P CHOLECYSTECTOMY: Primary | ICD-10-CM

## 2021-02-22 PROCEDURE — 99214 OFFICE O/P EST MOD 30 MIN: CPT | Performed by: SURGERY

## 2021-02-22 RX ORDER — TRAMADOL HYDROCHLORIDE 50 MG/1
50 TABLET ORAL
COMMUNITY
Start: 2021-02-10

## 2021-02-22 RX ORDER — MUPIROCIN 20 MG/G
2 OINTMENT TOPICAL
COMMUNITY
Start: 2020-12-22

## 2021-02-22 RX ORDER — HUMAN INSULIN 100 [IU]/ML
INJECTION, SUSPENSION SUBCUTANEOUS DAILY
COMMUNITY
Start: 2021-01-28

## 2021-02-22 RX ORDER — DICLOFENAC SODIUM 10 MG/G
GEL TOPICAL
COMMUNITY
Start: 2021-01-25

## 2021-04-15 ENCOUNTER — PATIENT MESSAGE (OUTPATIENT)
Dept: RESEARCH | Facility: HOSPITAL | Age: 66
End: 2021-04-15

## (undated) DEVICE — SUT VICRYL+ 1 CT1 18IN

## (undated) DEVICE — SEE MEDLINE ITEM 146231

## (undated) DEVICE — SPONGE LAP 18X18 PREWASHED

## (undated) DEVICE — DRAPE SURG W/TWL 17 5/8X23

## (undated) DEVICE — SUT VICRYL 2-0 8-18 CP-2

## (undated) DEVICE — STAPLER SKIN ROTATING HEAD

## (undated) DEVICE — SEE MEDLINE ITEM 152523

## (undated) DEVICE — PADDING CAST SPECIALIST 6X4YD

## (undated) DEVICE — Device

## (undated) DEVICE — DRESSING N ADH OIL EMUL 3X8 3S

## (undated) DEVICE — NDL 18GA X1 1/2 REG BEVEL

## (undated) DEVICE — DRAPE STERI U-SHAPED 47X51IN

## (undated) DEVICE — APPLICATOR CHLORAPREP ORN 26ML

## (undated) DEVICE — BRACE KNEE T SCOPE PREMIER

## (undated) DEVICE — SYR 30CC LUER LOCK

## (undated) DEVICE — DRESSING N ADH OIL EMUL 3X3

## (undated) DEVICE — SEE MEDLINE ITEM 146298

## (undated) DEVICE — SOL 9P NACL IRR PIC IL

## (undated) DEVICE — DRAPE INCISE IOBAN 2 23X17IN

## (undated) DEVICE — PAD ABD 8X10 STERILE

## (undated) DEVICE — CATH IV CATHLON W/HUB14GAX

## (undated) DEVICE — SEE MEDLINE ITEM 152530

## (undated) DEVICE — SEE MEDLINE ITEM 157216

## (undated) DEVICE — ORTHOCORD W/OS-6

## (undated) DEVICE — IMMOBILIZER KNEE X-LG 19

## (undated) DEVICE — SEE MEDLINE ITEM 157131

## (undated) DEVICE — DRAPE XRAY EQUIPMENT UNIV

## (undated) DEVICE — GAUZE SPONGE 4X4 12PLY

## (undated) DEVICE — BANDAGE ACE ELASTIC 6"

## (undated) DEVICE — SUT HSE FIBER 36IN MO6 NDL

## (undated) DEVICE — SUT HI-FI 5 1X36IN 48MM NDL

## (undated) DEVICE — BIT DRILL 2.7.

## (undated) DEVICE — SEE MEDLINE ITEM 146271

## (undated) DEVICE — STRIP STERI REIN CLSR 1/2X2IN

## (undated) DEVICE — 2MM HI FI TAPE

## (undated) DEVICE — GAUZE SPONGE 4'X4 12 PLY